# Patient Record
Sex: FEMALE | Race: WHITE | NOT HISPANIC OR LATINO
[De-identification: names, ages, dates, MRNs, and addresses within clinical notes are randomized per-mention and may not be internally consistent; named-entity substitution may affect disease eponyms.]

---

## 2018-06-06 ENCOUNTER — APPOINTMENT (OUTPATIENT)
Dept: OBGYN | Facility: CLINIC | Age: 58
End: 2018-06-06
Payer: COMMERCIAL

## 2018-06-06 VITALS
DIASTOLIC BLOOD PRESSURE: 72 MMHG | WEIGHT: 232 LBS | SYSTOLIC BLOOD PRESSURE: 103 MMHG | HEIGHT: 67 IN | BODY MASS INDEX: 36.41 KG/M2

## 2018-06-06 DIAGNOSIS — Z01.419 ENCOUNTER FOR GYNECOLOGICAL EXAMINATION (GENERAL) (ROUTINE) W/OUT ABNORMAL FINDINGS: ICD-10-CM

## 2018-06-06 PROCEDURE — 99396 PREV VISIT EST AGE 40-64: CPT

## 2018-06-07 LAB — HPV HIGH+LOW RISK DNA PNL CVX: NOT DETECTED

## 2018-06-12 LAB — CYTOLOGY CVX/VAG DOC THIN PREP: NORMAL

## 2020-09-25 ENCOUNTER — APPOINTMENT (OUTPATIENT)
Dept: DISASTER EMERGENCY | Facility: CLINIC | Age: 60
End: 2020-09-25

## 2020-09-25 DIAGNOSIS — Z01.818 ENCOUNTER FOR OTHER PREPROCEDURAL EXAMINATION: ICD-10-CM

## 2020-09-26 LAB — SARS-COV-2 N GENE NPH QL NAA+PROBE: NOT DETECTED

## 2021-03-24 ENCOUNTER — NON-APPOINTMENT (OUTPATIENT)
Age: 61
End: 2021-03-24

## 2021-03-24 DIAGNOSIS — Z63.5 DISRUPTION OF FAMILY BY SEPARATION AND DIVORCE: ICD-10-CM

## 2021-03-24 DIAGNOSIS — Z78.9 OTHER SPECIFIED HEALTH STATUS: ICD-10-CM

## 2021-03-24 RX ORDER — ESOMEPRAZOLE MAGNESIUM 20 MG/1
20 CAPSULE, DELAYED RELEASE ORAL
Refills: 0 | Status: ACTIVE | COMMUNITY

## 2021-03-24 RX ORDER — ESCITALOPRAM OXALATE 20 MG/1
20 TABLET, FILM COATED ORAL
Refills: 0 | Status: ACTIVE | COMMUNITY

## 2021-03-24 SDOH — SOCIAL STABILITY - SOCIAL INSECURITY: DISRUPTION OF FAMILY BY SEPARATION AND DIVORCE: Z63.5

## 2021-06-09 ENCOUNTER — APPOINTMENT (OUTPATIENT)
Dept: OBGYN | Facility: CLINIC | Age: 61
End: 2021-06-09
Payer: COMMERCIAL

## 2021-06-09 VITALS
BODY MASS INDEX: 38.61 KG/M2 | SYSTOLIC BLOOD PRESSURE: 130 MMHG | WEIGHT: 246 LBS | HEIGHT: 67 IN | DIASTOLIC BLOOD PRESSURE: 80 MMHG

## 2021-06-09 DIAGNOSIS — Z01.419 ENCOUNTER FOR GYNECOLOGICAL EXAMINATION (GENERAL) (ROUTINE) W/OUT ABNORMAL FINDINGS: ICD-10-CM

## 2021-06-09 DIAGNOSIS — N76.3 SUBACUTE AND CHRONIC VULVITIS: ICD-10-CM

## 2021-06-09 PROCEDURE — 99072 ADDL SUPL MATRL&STAF TM PHE: CPT

## 2021-06-09 PROCEDURE — 99396 PREV VISIT EST AGE 40-64: CPT

## 2021-06-09 RX ORDER — CLOBETASOL PROPIONATE 0.5 MG/G
0.05 CREAM TOPICAL TWICE DAILY
Qty: 1 | Refills: 1 | Status: ACTIVE | COMMUNITY
Start: 2021-06-09 | End: 1900-01-01

## 2021-06-10 LAB — HPV HIGH+LOW RISK DNA PNL CVX: NOT DETECTED

## 2021-06-14 LAB — CYTOLOGY CVX/VAG DOC THIN PREP: ABNORMAL

## 2021-06-21 ENCOUNTER — APPOINTMENT (OUTPATIENT)
Dept: OBGYN | Facility: CLINIC | Age: 61
End: 2021-06-21

## 2021-11-02 ENCOUNTER — INPATIENT (INPATIENT)
Facility: HOSPITAL | Age: 61
LOS: 1 days | Discharge: HOME | DRG: 386 | End: 2021-11-04
Attending: STUDENT IN AN ORGANIZED HEALTH CARE EDUCATION/TRAINING PROGRAM | Admitting: HOSPITALIST
Payer: COMMERCIAL

## 2021-11-02 ENCOUNTER — NON-APPOINTMENT (OUTPATIENT)
Age: 61
End: 2021-11-02

## 2021-11-02 VITALS
HEIGHT: 67 IN | HEART RATE: 89 BPM | SYSTOLIC BLOOD PRESSURE: 136 MMHG | DIASTOLIC BLOOD PRESSURE: 87 MMHG | TEMPERATURE: 98 F | WEIGHT: 240.08 LBS | OXYGEN SATURATION: 95 % | RESPIRATION RATE: 18 BRPM

## 2021-11-02 DIAGNOSIS — R10.9 UNSPECIFIED ABDOMINAL PAIN: ICD-10-CM

## 2021-11-02 LAB
ALBUMIN SERPL ELPH-MCNC: 4.2 G/DL — SIGNIFICANT CHANGE UP (ref 3.3–5)
ALP SERPL-CCNC: 92 U/L — SIGNIFICANT CHANGE UP (ref 40–120)
ALT FLD-CCNC: 17 U/L — SIGNIFICANT CHANGE UP (ref 10–45)
ANION GAP SERPL CALC-SCNC: 13 MMOL/L — SIGNIFICANT CHANGE UP (ref 5–17)
APTT BLD: 31.9 SEC — SIGNIFICANT CHANGE UP (ref 27.5–35.5)
AST SERPL-CCNC: 17 U/L — SIGNIFICANT CHANGE UP (ref 10–40)
BASOPHILS # BLD AUTO: 0.05 K/UL — SIGNIFICANT CHANGE UP (ref 0–0.2)
BASOPHILS NFR BLD AUTO: 0.5 % — SIGNIFICANT CHANGE UP (ref 0–2)
BILIRUB SERPL-MCNC: 0.2 MG/DL — SIGNIFICANT CHANGE UP (ref 0.2–1.2)
BLD GP AB SCN SERPL QL: NEGATIVE — SIGNIFICANT CHANGE UP
BUN SERPL-MCNC: 16 MG/DL — SIGNIFICANT CHANGE UP (ref 7–23)
CALCIUM SERPL-MCNC: 9.7 MG/DL — SIGNIFICANT CHANGE UP (ref 8.4–10.5)
CHLORIDE SERPL-SCNC: 102 MMOL/L — SIGNIFICANT CHANGE UP (ref 96–108)
CO2 SERPL-SCNC: 23 MMOL/L — SIGNIFICANT CHANGE UP (ref 22–31)
CREAT SERPL-MCNC: 0.87 MG/DL — SIGNIFICANT CHANGE UP (ref 0.5–1.3)
EOSINOPHIL # BLD AUTO: 0.17 K/UL — SIGNIFICANT CHANGE UP (ref 0–0.5)
EOSINOPHIL NFR BLD AUTO: 1.7 % — SIGNIFICANT CHANGE UP (ref 0–6)
GLUCOSE SERPL-MCNC: 108 MG/DL — HIGH (ref 70–99)
HCT VFR BLD CALC: 38.4 % — SIGNIFICANT CHANGE UP (ref 34.5–45)
HGB BLD-MCNC: 11.7 G/DL — SIGNIFICANT CHANGE UP (ref 11.5–15.5)
IMM GRANULOCYTES NFR BLD AUTO: 0.5 % — SIGNIFICANT CHANGE UP (ref 0–1.5)
INR BLD: 1.01 RATIO — SIGNIFICANT CHANGE UP (ref 0.88–1.16)
LIDOCAIN IGE QN: 33 U/L — SIGNIFICANT CHANGE UP (ref 7–60)
LYMPHOCYTES # BLD AUTO: 1.03 K/UL — SIGNIFICANT CHANGE UP (ref 1–3.3)
LYMPHOCYTES # BLD AUTO: 10.2 % — LOW (ref 13–44)
MCHC RBC-ENTMCNC: 24.9 PG — LOW (ref 27–34)
MCHC RBC-ENTMCNC: 30.5 GM/DL — LOW (ref 32–36)
MCV RBC AUTO: 81.7 FL — SIGNIFICANT CHANGE UP (ref 80–100)
MONOCYTES # BLD AUTO: 0.69 K/UL — SIGNIFICANT CHANGE UP (ref 0–0.9)
MONOCYTES NFR BLD AUTO: 6.9 % — SIGNIFICANT CHANGE UP (ref 2–14)
NEUTROPHILS # BLD AUTO: 8.06 K/UL — HIGH (ref 1.8–7.4)
NEUTROPHILS NFR BLD AUTO: 80.2 % — HIGH (ref 43–77)
NRBC # BLD: 0 /100 WBCS — SIGNIFICANT CHANGE UP (ref 0–0)
PLATELET # BLD AUTO: 347 K/UL — SIGNIFICANT CHANGE UP (ref 150–400)
POTASSIUM SERPL-MCNC: 4.3 MMOL/L — SIGNIFICANT CHANGE UP (ref 3.5–5.3)
POTASSIUM SERPL-SCNC: 4.3 MMOL/L — SIGNIFICANT CHANGE UP (ref 3.5–5.3)
PROT SERPL-MCNC: 7.2 G/DL — SIGNIFICANT CHANGE UP (ref 6–8.3)
PROTHROM AB SERPL-ACNC: 12.1 SEC — SIGNIFICANT CHANGE UP (ref 10.6–13.6)
RBC # BLD: 4.7 M/UL — SIGNIFICANT CHANGE UP (ref 3.8–5.2)
RBC # FLD: 14.8 % — HIGH (ref 10.3–14.5)
RH IG SCN BLD-IMP: POSITIVE — SIGNIFICANT CHANGE UP
SODIUM SERPL-SCNC: 138 MMOL/L — SIGNIFICANT CHANGE UP (ref 135–145)
WBC # BLD: 10.05 K/UL — SIGNIFICANT CHANGE UP (ref 3.8–10.5)
WBC # FLD AUTO: 10.05 K/UL — SIGNIFICANT CHANGE UP (ref 3.8–10.5)

## 2021-11-02 PROCEDURE — 93010 ELECTROCARDIOGRAM REPORT: CPT

## 2021-11-02 PROCEDURE — 74178 CT ABD&PLV WO CNTR FLWD CNTR: CPT | Mod: 26,MA

## 2021-11-02 PROCEDURE — 99285 EMERGENCY DEPT VISIT HI MDM: CPT | Mod: 25

## 2021-11-02 RX ORDER — CEFTRIAXONE 500 MG/1
1000 INJECTION, POWDER, FOR SOLUTION INTRAMUSCULAR; INTRAVENOUS ONCE
Refills: 0 | Status: COMPLETED | OUTPATIENT
Start: 2021-11-02 | End: 2021-11-02

## 2021-11-02 RX ORDER — CEFTRIAXONE 500 MG/1
1000 INJECTION, POWDER, FOR SOLUTION INTRAMUSCULAR; INTRAVENOUS ONCE
Refills: 0 | Status: DISCONTINUED | OUTPATIENT
Start: 2021-11-02 | End: 2021-11-02

## 2021-11-02 RX ORDER — ACETAMINOPHEN 500 MG
975 TABLET ORAL ONCE
Refills: 0 | Status: COMPLETED | OUTPATIENT
Start: 2021-11-02 | End: 2021-11-02

## 2021-11-02 RX ORDER — ONDANSETRON 8 MG/1
4 TABLET, FILM COATED ORAL ONCE
Refills: 0 | Status: COMPLETED | OUTPATIENT
Start: 2021-11-02 | End: 2021-11-02

## 2021-11-02 RX ORDER — METRONIDAZOLE 500 MG
500 TABLET ORAL ONCE
Refills: 0 | Status: COMPLETED | OUTPATIENT
Start: 2021-11-02 | End: 2021-11-02

## 2021-11-02 RX ADMIN — ONDANSETRON 4 MILLIGRAM(S): 8 TABLET, FILM COATED ORAL at 16:49

## 2021-11-02 RX ADMIN — CEFTRIAXONE 100 MILLIGRAM(S): 500 INJECTION, POWDER, FOR SOLUTION INTRAMUSCULAR; INTRAVENOUS at 23:11

## 2021-11-02 RX ADMIN — Medication 100 MILLIGRAM(S): at 21:09

## 2021-11-02 RX ADMIN — Medication 975 MILLIGRAM(S): at 16:48

## 2021-11-02 NOTE — ED PROVIDER NOTE - CARE PLAN
1 Principal Discharge DX:	Abdominal pain  Secondary Diagnosis:	Bloody stool  Secondary Diagnosis:	Pancolitis

## 2021-11-02 NOTE — ED ADULT NURSE NOTE - OBJECTIVE STATEMENT
61 y/o female PMHX Colitis, Gerd, presents with complaints of blood in stool. pt states on Sunday and Monday she noticed dark colored blood clots in stool, today it has subsided. pt states she has mid abdominal pain radiating to left lower abdominal region. pt denies any interventions. states pain is 7/10 colicky pain. denies any CP or SOB, denies any fever, chills, nausea, vomiting or diarrhea. denies any dizziness or blurry vision. pt states she has a headache. denies any urinary symptoms. safety precautions in place call bell in reach.

## 2021-11-02 NOTE — ED PROVIDER NOTE - CLINICAL SUMMARY MEDICAL DECISION MAKING FREE TEXT BOX
eriberto/pgy1: 61yo F with pmh of colitis never had bloody stool, no formal diagnosis of UC/IBD presents with bloody stool and diarrhea and LLQ abd pain. pain control, labs, CT a/p.

## 2021-11-02 NOTE — ED PROVIDER NOTE - PHYSICAL EXAMINATION
Reggie QUINTEROS:  VS noted  Gen. no acute distress, Non toxic   HEENT: EOMI, mmm  Lungs: CTAB/L no C/ W /R   CVS: RRR   Abd; Soft non tender, non distended, no CVA tenderness  Ext: no edema  Skin: no rash  Neuro AAOx3 non focal clear speech

## 2021-11-02 NOTE — ED ADULT NURSE REASSESSMENT NOTE - NS ED NURSE REASSESS COMMENT FT1
Pt is breathing unlabored on RA. Vital signs stable. Pt endorsing abd pain and nausea at this time. MD aware, states medication to be ordered. Educated pt on plan of care. Safety and comfort maintained.

## 2021-11-02 NOTE — ED PROVIDER NOTE - PROGRESS NOTE DETAILS
eriberto/pgy1: spoke to pt , made her aware of CT scan results. AGreeable to dc for IV fluids/antibiotics and pain control until feels improved. eriberto/pgy1: spoke to pt , made her aware of CT scan results. AGreeable to admit for IV fluids/antibiotics and pain control until feels improved.

## 2021-11-02 NOTE — ED PROVIDER NOTE - OBJECTIVE STATEMENT
59 yo female PMH of colitis and intestinal polyps on colonoscopy x1 year ago, presents complaining of dark red clots in stool x3 days. States started at random, has been passing dark red clots with and without stool since 10/31/21. + diarrhea, admits to normally having diarrhea dt colitis. She states clots stopped this morning at 8am and had x1 normal bowel movement in hospital today without clots. +tactile fever (not recorded at home), + nausea, + intermittent 5/10 abdominal pain in epigastric and LLQ relieved with defecation. + severe HA, has not taken anything for pain.     Patient denies trauma, dizziness, cough, SOB, chest pain, fatigue/ weakness, pain with urination, hematuria, recent illness, recent change in weight.

## 2021-11-02 NOTE — ED PROVIDER NOTE - ATTENDING CONTRIBUTION TO CARE
Patient is a 61 yo F with history of colitis x 1 year here for bloody stool x 3 days. She reports 4 episodes of bloody stool with clots. Denies a prior history of GI bleed. She has been following with Dr. Goodman of GI Patient is a 59 yo F with history of colitis x 1 year here for bloody stool x 3 days. She reports 4 episodes of bloody stool with clots. Denies a prior history of GI bleed. She has been following with Dr. Goodman of GI. Last colonoscopy last year. She reports she had some polyps. She states she was treated with steroids when she was initially diagnosed with colitis but nothing recently. Denies fevers, chills. She reports nausea, no vomiting. No urinary symptoms. She has chronic diarrhea. No recent antibiotic use. No chest pain or shortness of breath.    VS noted  Gen. no acute distress, Non toxic   HEENT: EOMI, mmm  Lungs: CTAB/L no C/ W /R   CVS: RRR   Abd; Soft non tender, non distended, no CVA tenderness  Ext: no edema  Skin: no rash  Neuro AAOx3 non focal clear speech  a/p: BRBPR - plan for labs, CTA A/P and reassess.   - Reggie QUINTEROS Patient is a 61 yo F with history of colitis x 1 year here for bloody stool x 3 days. She reports 4 episodes of bloody stool with clots. Denies a prior history of GI bleed. She has been following with Dr. Goodman of GI. Last colonoscopy last year. She reports she had some polyps. She states she was treated with steroids when she was initially diagnosed with colitis but nothing recently. Denies fevers, chills. She reports nausea, no vomiting. No urinary symptoms. She has chronic diarrhea. No recent antibiotic use. No chest pain or shortness of breath.    VS noted  Gen. no acute distress, Non toxic   HEENT: EOMI, mmm  Lungs: CTAB/L no C/ W /R   CVS: RRR   Abd; Soft non tender, non distended, no CVA tenderness  Ext: no edema  Skin: no rash  Neuro AAOx3 non focal clear speech  a/p: BRBPR - plan for labs, CTA A/P to eval for active bleed/ colitis, and reassess.   - Reggie QUINTEROS

## 2021-11-02 NOTE — ED PROVIDER NOTE - RAPID ASSESSMENT
Patient presenting with bloody stools and "clots" x days. Also having intermittent generalized abdominal pain. Diagnosed with "colitis" presumptively by outpatient provider. Some associated nausea today, no vomiting. Patient presenting with bloody stools and "clots" x days. Also having intermittent generalized abdominal pain. Diagnosed with "colitis" presumptively by outpatient provider. Some associated nausea today, no vomiting.      IGalo MD, performed an initial face to face bedside interview with this patient regarding history of present illness and determined that the patient should be evaluated in the main ED. This patient's evaluation is  NOT COMPLETE and only preliminary. The full assessment and management of this patient is deferred to the main ED provider.

## 2021-11-03 ENCOUNTER — NON-APPOINTMENT (OUTPATIENT)
Age: 61
End: 2021-11-03

## 2021-11-03 DIAGNOSIS — Z98.51 TUBAL LIGATION STATUS: Chronic | ICD-10-CM

## 2021-11-03 DIAGNOSIS — Z29.9 ENCOUNTER FOR PROPHYLACTIC MEASURES, UNSPECIFIED: ICD-10-CM

## 2021-11-03 DIAGNOSIS — K21.9 GASTRO-ESOPHAGEAL REFLUX DISEASE WITHOUT ESOPHAGITIS: ICD-10-CM

## 2021-11-03 DIAGNOSIS — K76.0 FATTY (CHANGE OF) LIVER, NOT ELSEWHERE CLASSIFIED: ICD-10-CM

## 2021-11-03 DIAGNOSIS — K51.00 ULCERATIVE (CHRONIC) PANCOLITIS WITHOUT COMPLICATIONS: ICD-10-CM

## 2021-11-03 DIAGNOSIS — F32.9 MAJOR DEPRESSIVE DISORDER, SINGLE EPISODE, UNSPECIFIED: ICD-10-CM

## 2021-11-03 DIAGNOSIS — E03.9 HYPOTHYROIDISM, UNSPECIFIED: ICD-10-CM

## 2021-11-03 LAB
ANION GAP SERPL CALC-SCNC: 12 MMOL/L — SIGNIFICANT CHANGE UP (ref 5–17)
BUN SERPL-MCNC: 13 MG/DL — SIGNIFICANT CHANGE UP (ref 7–23)
CALCIUM SERPL-MCNC: 9.7 MG/DL — SIGNIFICANT CHANGE UP (ref 8.4–10.5)
CHLORIDE SERPL-SCNC: 105 MMOL/L — SIGNIFICANT CHANGE UP (ref 96–108)
CO2 SERPL-SCNC: 24 MMOL/L — SIGNIFICANT CHANGE UP (ref 22–31)
CREAT SERPL-MCNC: 0.9 MG/DL — SIGNIFICANT CHANGE UP (ref 0.5–1.3)
CRP SERPL-MCNC: 17 MG/L — HIGH (ref 0–4)
CULTURE RESULTS: SIGNIFICANT CHANGE UP
ERYTHROCYTE [SEDIMENTATION RATE] IN BLOOD: 52 MM/HR — HIGH (ref 0–20)
GLUCOSE SERPL-MCNC: 103 MG/DL — HIGH (ref 70–99)
HCT VFR BLD CALC: 34.1 % — LOW (ref 34.5–45)
HGB BLD-MCNC: 10.5 G/DL — LOW (ref 11.5–15.5)
MAGNESIUM SERPL-MCNC: 2 MG/DL — SIGNIFICANT CHANGE UP (ref 1.6–2.6)
MCHC RBC-ENTMCNC: 24.8 PG — LOW (ref 27–34)
MCHC RBC-ENTMCNC: 30.8 GM/DL — LOW (ref 32–36)
MCV RBC AUTO: 80.6 FL — SIGNIFICANT CHANGE UP (ref 80–100)
NRBC # BLD: 0 /100 WBCS — SIGNIFICANT CHANGE UP (ref 0–0)
PHOSPHATE SERPL-MCNC: 4 MG/DL — SIGNIFICANT CHANGE UP (ref 2.5–4.5)
PLATELET # BLD AUTO: 307 K/UL — SIGNIFICANT CHANGE UP (ref 150–400)
POTASSIUM SERPL-MCNC: 4.1 MMOL/L — SIGNIFICANT CHANGE UP (ref 3.5–5.3)
POTASSIUM SERPL-SCNC: 4.1 MMOL/L — SIGNIFICANT CHANGE UP (ref 3.5–5.3)
RBC # BLD: 4.23 M/UL — SIGNIFICANT CHANGE UP (ref 3.8–5.2)
RBC # FLD: 15 % — HIGH (ref 10.3–14.5)
SARS-COV-2 RNA SPEC QL NAA+PROBE: SIGNIFICANT CHANGE UP
SODIUM SERPL-SCNC: 141 MMOL/L — SIGNIFICANT CHANGE UP (ref 135–145)
SPECIMEN SOURCE: SIGNIFICANT CHANGE UP
WBC # BLD: 8.53 K/UL — SIGNIFICANT CHANGE UP (ref 3.8–10.5)
WBC # FLD AUTO: 8.53 K/UL — SIGNIFICANT CHANGE UP (ref 3.8–10.5)

## 2021-11-03 PROCEDURE — 12345: CPT | Mod: NC

## 2021-11-03 PROCEDURE — 99223 1ST HOSP IP/OBS HIGH 75: CPT

## 2021-11-03 RX ORDER — ESCITALOPRAM OXALATE 10 MG/1
20 TABLET, FILM COATED ORAL DAILY
Refills: 0 | Status: DISCONTINUED | OUTPATIENT
Start: 2021-11-03 | End: 2021-11-04

## 2021-11-03 RX ORDER — ONDANSETRON 8 MG/1
4 TABLET, FILM COATED ORAL ONCE
Refills: 0 | Status: COMPLETED | OUTPATIENT
Start: 2021-11-03 | End: 2021-11-03

## 2021-11-03 RX ORDER — CIPROFLOXACIN LACTATE 400MG/40ML
400 VIAL (ML) INTRAVENOUS EVERY 12 HOURS
Refills: 0 | Status: DISCONTINUED | OUTPATIENT
Start: 2021-11-03 | End: 2021-11-04

## 2021-11-03 RX ORDER — PANTOPRAZOLE SODIUM 20 MG/1
40 TABLET, DELAYED RELEASE ORAL
Refills: 0 | Status: DISCONTINUED | OUTPATIENT
Start: 2021-11-03 | End: 2021-11-04

## 2021-11-03 RX ORDER — SIMETHICONE 80 MG/1
80 TABLET, CHEWABLE ORAL ONCE
Refills: 0 | Status: COMPLETED | OUTPATIENT
Start: 2021-11-03 | End: 2021-11-03

## 2021-11-03 RX ORDER — METRONIDAZOLE 500 MG
500 TABLET ORAL EVERY 8 HOURS
Refills: 0 | Status: DISCONTINUED | OUTPATIENT
Start: 2021-11-03 | End: 2021-11-03

## 2021-11-03 RX ORDER — LEVOTHYROXINE SODIUM 125 MCG
125 TABLET ORAL DAILY
Refills: 0 | Status: DISCONTINUED | OUTPATIENT
Start: 2021-11-03 | End: 2021-11-04

## 2021-11-03 RX ORDER — METRONIDAZOLE 500 MG
500 TABLET ORAL EVERY 8 HOURS
Refills: 0 | Status: DISCONTINUED | OUTPATIENT
Start: 2021-11-03 | End: 2021-11-04

## 2021-11-03 RX ORDER — ACETAMINOPHEN 500 MG
650 TABLET ORAL EVERY 6 HOURS
Refills: 0 | Status: DISCONTINUED | OUTPATIENT
Start: 2021-11-03 | End: 2021-11-04

## 2021-11-03 RX ADMIN — Medication 200 MILLIGRAM(S): at 17:59

## 2021-11-03 RX ADMIN — Medication 500 MILLIGRAM(S): at 14:30

## 2021-11-03 RX ADMIN — Medication 200 MILLIGRAM(S): at 06:21

## 2021-11-03 RX ADMIN — Medication 650 MILLIGRAM(S): at 01:33

## 2021-11-03 RX ADMIN — SIMETHICONE 80 MILLIGRAM(S): 80 TABLET, CHEWABLE ORAL at 20:07

## 2021-11-03 RX ADMIN — ONDANSETRON 4 MILLIGRAM(S): 8 TABLET, FILM COATED ORAL at 10:29

## 2021-11-03 RX ADMIN — PANTOPRAZOLE SODIUM 40 MILLIGRAM(S): 20 TABLET, DELAYED RELEASE ORAL at 05:24

## 2021-11-03 RX ADMIN — Medication 125 MICROGRAM(S): at 05:19

## 2021-11-03 RX ADMIN — ESCITALOPRAM OXALATE 20 MILLIGRAM(S): 10 TABLET, FILM COATED ORAL at 11:17

## 2021-11-03 RX ADMIN — Medication 100 MILLIGRAM(S): at 05:19

## 2021-11-03 RX ADMIN — Medication 500 MILLIGRAM(S): at 22:23

## 2021-11-03 NOTE — H&P ADULT - NSHPLABSRESULTS_GEN_ALL_CORE
EKG personally reviewed. NSR, QTc 430  Imaging personally reviewed.   Labs personally reviewed.     c< from: CT Abdomen and Pelvis w/wo IV Cont (11.02.21 @ 19:06) >    CONTRAST/COMPLICATIONS:  IV Contrast: Omnipaque 350  90 cc administered   10 cc discarded  Oral Contrast: None  Complications: None reported at time of study completion    PROCEDURE:  CT of the Abdomen and Pelvis was performed.  Precontrast, Arterial and Delayed phases were performed.  Sagittal and coronal reformats were performed.    FINDINGS:  LOWER CHEST: Right fat-containing Bochdalek hernia. Lingular and bibasilar dependent atelectasis.    LIVER: Steatosis.  BILE DUCTS: Normal caliber.  GALLBLADDER: Within normal limits.  SPLEEN: Within normal limits.  PANCREAS: Within normal limits.  ADRENALS: Within normal limits.  KIDNEYS/URETERS: Hyperdense left renal cyst No hydronephrosis or nephrolithiasis.    BLADDER: Within normal limits.  REPRODUCTIVE ORGANS: Uterus and adnexa within normal limits.    BOWEL: No bowel obstruction. Appendix is normal. Colonic diverticulosis. Diffuse colonic wall thickening with vasa recta engorgement and areas of trace pericolonic fat stranding suggesting pancolitis. No intraluminal contrast extravasation on postcontrast images to suggest active gastrointestinal bleeding.  PERITONEUM: No ascites.  VESSELS: Atherosclerotic changes. Celiac axis, SMA, CHELY, portal vein with its tributaries are patent.  RETROPERITONEUM/LYMPH NODES: No lymphadenopathy.  ABDOMINAL WALL: Tiny fat-containing umbilical hernia.  BONES: Degenerative changes. Grade 1 retrolisthesis of L2 on L3.    IMPRESSION:    Pancolitis, infectious or inflammatory and less likely ischemic.    No evidence of activegastrointestinal bleeding.    < end of copied text >

## 2021-11-03 NOTE — H&P ADULT - PROBLEM SELECTOR PLAN 1
Patient presents with diarrhea with BRBPR found to have pancolitis on CT likely infectious vs inflammatory  - bleeding resolved, Hgb stable, continue to trend. No active bleeding on CT.  - f/u GI PCR, stool culture, and monitor stool count  - continue abx for now with cipro and flagyl  - f/u ESR, CRP  - given hx of colitis in past, consider GI consult in am  - tolerating regular diet

## 2021-11-03 NOTE — H&P ADULT - NSICDXFAMILYHX_GEN_ALL_CORE_FT
FAMILY HISTORY:  Father  Still living? Unknown  Family history of type 2 diabetes mellitus in mother, Age at diagnosis: Age Unknown    Mother  Still living? Unknown  Family history of type 2 diabetes mellitus in mother, Age at diagnosis: Age Unknown  FHx: non-Hodgkin's lymphoma, Age at diagnosis: Age Unknown     none

## 2021-11-03 NOTE — H&P ADULT - NSICDXPASTMEDICALHX_GEN_ALL_CORE_FT
PAST MEDICAL HISTORY:  Hypothyroidism      PAST MEDICAL HISTORY:  Asthma     GERD (gastroesophageal reflux disease)     Hypothyroidism     Other depression

## 2021-11-03 NOTE — H&P ADULT - ASSESSMENT
60F with PMH of colitis and intestinal polyps (last colonoscopy was 1 year ago, reported normal), asthma, depression, hypothyroid, and GERD, presents with 3 days of bloody stools with clots, found to have pancolitis on CT A/P infectious vs inflammatory.

## 2021-11-03 NOTE — H&P ADULT - HISTORY OF PRESENT ILLNESS
60F with PMH of colitis and intestinal polyps (last colonoscopy was 1 year ago, reported normal), asthma, depression, hypothyroid, and GERD, presents with 3 days of bloody stools with clots. Patient says she has had diarrhea with watery stools for years now (up to 8 BMs a day), being worked up by GI (Dr Mckinley). 3 days ago, she started having blood BMs with clots as well as epigastric abdominal pain and LLQ pain. Since then the bloody BMs have resolved. Patient also endorses nausea but no vomiting and has been tolerating her diet. Patient denies any fevers, chills, chest pain, shortness of breath, dysuria or hematuria. No recent weight loss. Patient is on a low diet.     In the ED, T was 98.1, HR75, /87, RR 18 satting 95% on room air. Patient received Tylenol 975mg X1, Zofran 4IV X1, ceftriaxone 1g X1, and flagyl 500mg X1.

## 2021-11-03 NOTE — PATIENT PROFILE ADULT - FALL HARM RISK
Quality 110: Preventive Care And Screening: Influenza Immunization: Influenza Immunization Administered during Influenza season Detail Level: Detailed Quality 111:Pneumonia Vaccination Status For Older Adults: Pneumococcal Vaccination Previously Received Quality 130: Documentation Of Current Medications In The Medical Record: Current Medications Documented n/a

## 2021-11-03 NOTE — CHART NOTE - NSCHARTNOTEFT_GEN_A_CORE
60F with PMH of colitis and intestinal polyps (last colonoscopy was 1 year ago, reported normal), asthma, depression, hypothyroid, and GERD, presents with 3 days of bloody stools with clots, found to have pancolitis on CTAP    Started on cipro/flagyl overnight  Hemodynamically stable, remains afebrile  Pt subjectively feels less distended this morning, was able to tolerate full breakfast    - continue cipro/flagyl and monitor   - if able to tolerate regular diet today, transition to po abx and dc planning in AM    above plans discussed with NP Tara Banks MD  Division of Hospital Medicine  Cell: [phone]  Office: [phone]

## 2021-11-04 ENCOUNTER — TRANSCRIPTION ENCOUNTER (OUTPATIENT)
Age: 61
End: 2021-11-04

## 2021-11-04 VITALS
RESPIRATION RATE: 18 BRPM | DIASTOLIC BLOOD PRESSURE: 69 MMHG | TEMPERATURE: 99 F | OXYGEN SATURATION: 96 % | SYSTOLIC BLOOD PRESSURE: 115 MMHG | HEART RATE: 83 BPM

## 2021-11-04 DIAGNOSIS — K92.1 MELENA: ICD-10-CM

## 2021-11-04 DIAGNOSIS — K51.00 ULCERATIVE (CHRONIC) PANCOLITIS WITHOUT COMPLICATIONS: ICD-10-CM

## 2021-11-04 LAB
C DIFF GDH STL QL: NEGATIVE — SIGNIFICANT CHANGE UP
C DIFF GDH STL QL: SIGNIFICANT CHANGE UP
COVID-19 NUCLEOCAPSID GAM AB INTERP: POSITIVE
COVID-19 NUCLEOCAPSID TOTAL GAM ANTIBODY RESULT: 1.86 INDEX — HIGH
COVID-19 SPIKE DOMAIN AB INTERP: POSITIVE
COVID-19 SPIKE DOMAIN ANTIBODY RESULT: >250 U/ML — HIGH
HCV AB S/CO SERPL IA: 0.2 S/CO — SIGNIFICANT CHANGE UP (ref 0–0.99)
HCV AB SERPL-IMP: SIGNIFICANT CHANGE UP
SARS-COV-2 IGG+IGM SERPL QL IA: 1.86 INDEX — HIGH
SARS-COV-2 IGG+IGM SERPL QL IA: >250 U/ML — HIGH
SARS-COV-2 IGG+IGM SERPL QL IA: POSITIVE
SARS-COV-2 IGG+IGM SERPL QL IA: POSITIVE

## 2021-11-04 PROCEDURE — U0003: CPT

## 2021-11-04 PROCEDURE — 99233 SBSQ HOSP IP/OBS HIGH 50: CPT

## 2021-11-04 PROCEDURE — 74178 CT ABD&PLV WO CNTR FLWD CNTR: CPT | Mod: MA

## 2021-11-04 PROCEDURE — 86850 RBC ANTIBODY SCREEN: CPT

## 2021-11-04 PROCEDURE — 96374 THER/PROPH/DIAG INJ IV PUSH: CPT

## 2021-11-04 PROCEDURE — 85730 THROMBOPLASTIN TIME PARTIAL: CPT

## 2021-11-04 PROCEDURE — 86769 SARS-COV-2 COVID-19 ANTIBODY: CPT

## 2021-11-04 PROCEDURE — 87324 CLOSTRIDIUM AG IA: CPT

## 2021-11-04 PROCEDURE — 86140 C-REACTIVE PROTEIN: CPT

## 2021-11-04 PROCEDURE — 86900 BLOOD TYPING SEROLOGIC ABO: CPT

## 2021-11-04 PROCEDURE — 96375 TX/PRO/DX INJ NEW DRUG ADDON: CPT

## 2021-11-04 PROCEDURE — 80048 BASIC METABOLIC PNL TOTAL CA: CPT

## 2021-11-04 PROCEDURE — 85027 COMPLETE CBC AUTOMATED: CPT

## 2021-11-04 PROCEDURE — 83735 ASSAY OF MAGNESIUM: CPT

## 2021-11-04 PROCEDURE — U0005: CPT

## 2021-11-04 PROCEDURE — 85025 COMPLETE CBC W/AUTO DIFF WBC: CPT

## 2021-11-04 PROCEDURE — 87449 NOS EACH ORGANISM AG IA: CPT

## 2021-11-04 PROCEDURE — 36415 COLL VENOUS BLD VENIPUNCTURE: CPT

## 2021-11-04 PROCEDURE — 85610 PROTHROMBIN TIME: CPT

## 2021-11-04 PROCEDURE — 84100 ASSAY OF PHOSPHORUS: CPT

## 2021-11-04 PROCEDURE — 87507 IADNA-DNA/RNA PROBE TQ 12-25: CPT

## 2021-11-04 PROCEDURE — 87046 STOOL CULTR AEROBIC BACT EA: CPT

## 2021-11-04 PROCEDURE — 93005 ELECTROCARDIOGRAM TRACING: CPT

## 2021-11-04 PROCEDURE — 83690 ASSAY OF LIPASE: CPT

## 2021-11-04 PROCEDURE — 86803 HEPATITIS C AB TEST: CPT

## 2021-11-04 PROCEDURE — 85652 RBC SED RATE AUTOMATED: CPT

## 2021-11-04 PROCEDURE — 87077 CULTURE AEROBIC IDENTIFY: CPT

## 2021-11-04 PROCEDURE — 87045 FECES CULTURE AEROBIC BACT: CPT

## 2021-11-04 PROCEDURE — 86901 BLOOD TYPING SEROLOGIC RH(D): CPT

## 2021-11-04 PROCEDURE — 99285 EMERGENCY DEPT VISIT HI MDM: CPT | Mod: 25

## 2021-11-04 PROCEDURE — 80053 COMPREHEN METABOLIC PANEL: CPT

## 2021-11-04 RX ORDER — MOXIFLOXACIN HYDROCHLORIDE TABLETS, 400 MG 400 MG/1
1 TABLET, FILM COATED ORAL
Qty: 16 | Refills: 0
Start: 2021-11-04 | End: 2021-11-11

## 2021-11-04 RX ORDER — SIMETHICONE 80 MG/1
80 TABLET, CHEWABLE ORAL ONCE
Refills: 0 | Status: COMPLETED | OUTPATIENT
Start: 2021-11-04 | End: 2021-11-04

## 2021-11-04 RX ORDER — SIMETHICONE 80 MG/1
1 TABLET, CHEWABLE ORAL
Qty: 30 | Refills: 0
Start: 2021-11-04 | End: 2021-12-03

## 2021-11-04 RX ORDER — METRONIDAZOLE 500 MG
1 TABLET ORAL
Qty: 24 | Refills: 0
Start: 2021-11-04 | End: 2021-11-11

## 2021-11-04 RX ADMIN — Medication 125 MICROGRAM(S): at 06:16

## 2021-11-04 RX ADMIN — Medication 500 MILLIGRAM(S): at 13:05

## 2021-11-04 RX ADMIN — ESCITALOPRAM OXALATE 20 MILLIGRAM(S): 10 TABLET, FILM COATED ORAL at 12:55

## 2021-11-04 RX ADMIN — Medication 500 MILLIGRAM(S): at 06:18

## 2021-11-04 RX ADMIN — Medication 650 MILLIGRAM(S): at 04:58

## 2021-11-04 RX ADMIN — Medication 650 MILLIGRAM(S): at 16:42

## 2021-11-04 RX ADMIN — Medication 200 MILLIGRAM(S): at 06:16

## 2021-11-04 RX ADMIN — SIMETHICONE 80 MILLIGRAM(S): 80 TABLET, CHEWABLE ORAL at 15:20

## 2021-11-04 RX ADMIN — Medication 650 MILLIGRAM(S): at 05:30

## 2021-11-04 RX ADMIN — Medication 200 MILLIGRAM(S): at 16:41

## 2021-11-04 RX ADMIN — PANTOPRAZOLE SODIUM 40 MILLIGRAM(S): 20 TABLET, DELAYED RELEASE ORAL at 06:16

## 2021-11-04 NOTE — DISCHARGE NOTE PROVIDER - NSDCPNSUBOBJ_GEN_ALL_CORE
Pt admitted for colitis ciff and PCR stool negative.  Has hx of 3 years of colitis with outpt GI w/u.  Now not having bloodly diarrhea. Can f/u as outpt with GI for ongoing management.  Will DC on 10 days of antibiotics for infectious colitis on top of her chronic diarrhea.  Seen by GI shivam mallory.  Discharge time 45 minutes

## 2021-11-04 NOTE — DISCHARGE NOTE PROVIDER - HOSPITAL COURSE
60F with PMH of colitis and intestinal polyps (last colonoscopy was 1 year ago, reported normal), asthma, depression, hypothyroid, and GERD, presents with 3 days of bloody stools with clots, found to have pancolitis on CT A/P infectious vs inflammatory.      Problem/Plan - 1:  ·  Problem: Pancolitis.   ·  Plan: 4/7 hx of diarrhea with BRBPR on background of 3yr history of chronic diarrhoea. NO known personal/family hx of IBD, was on steroid however.  Vitilago but no other autoimmune disease. NO recent Abx use. Last scope (SEPT 2020) shows 'sandlike' colonic wall, but NO inflammation/ polyps.  - CT on admission shows pancolitis  - Diarrhoea has improved, still mildly worse than normal as per patient  - Bleeding resolved, Hgb stable (105 from 117), other BWs unremarkable (no electrolyte imbalance, CRP 1.7, normal renal function)  - Tolerating full oral diet, patient would like to return home  - Stool studies NEG; C.diff NEG  - Day 2 antibiotics (metronidazole and cipro), both oral now, will continue for total of 10 days  - GI consulted pt wants to switch outpt f/u.       Problem/Plan - 2:  ·  Problem: Hypothyroid.   ·  Plan: - continue home synthroid.     Problem/Plan - 3:  ·  Problem: Steatosis, liver.   ·  Plan: - CT showing hepatic steatosis likely weigh related no significant ETOH history  - no LFT abnormalities  - will need outpatient follow up.       Problem/Plan - 4:  ·  Problem: GERD (gastroesophageal reflux disease).   ·  Plan: - start protonix 40mg qd.     Problem/Plan - 5:  ·  Problem: Major depression.   ·  Plan: - resume home escitalopram.

## 2021-11-04 NOTE — DISCHARGE NOTE PROVIDER - CARE PROVIDER_API CALL
David Perez)  Internal Medicine  [address] [place] [address]  Phone: [phone]  Fax: [phone]  Follow Up Time:

## 2021-11-04 NOTE — CONSULT NOTE ADULT - ASSESSMENT
60 year old female with hematochezia    1. Hematochezia. Anemia. ?resolved diverticular bleed, or bacterial colitis. Doubt UC flare given self limited nature, she feels back to baseline, wants to go home and follow up with her GI.   -no objection to d/c, with close outpt follow up. Indications for return to ED discussed.    2. Hx of colitis  -resume budesonide at prior dose, f/u with GI    3. HTN    4. Hypothyroid   on synthroid    5. Depression    6. Obesity  -weight loss

## 2021-11-04 NOTE — PROGRESS NOTE ADULT - SUBJECTIVE AND OBJECTIVE BOX
PROGRESS NOTE:   Jasmine Heredia DO  Hospitalist  Pager [phone]  After 5pm/weekends or if no answer ext: [phone]      Patient is a 60y old  Female who presents with a chief complaint of bloody stools (03 Nov 2021 00:42)      SUBJECTIVE / OVERNIGHT EVENTS: Abdo pain & bloody diarrhoea    ADDITIONAL REVIEW OF SYSTEMS:  No N/V, mildly worse diarrhoea    MEDICATIONS  (STANDING):  ciprofloxacin   IVPB 400 milliGRAM(s) IV Intermittent every 12 hours  escitalopram 20 milliGRAM(s) Oral daily  levothyroxine 125 MICROGram(s) Oral daily  metroNIDAZOLE    Tablet 500 milliGRAM(s) Oral every 8 hours  pantoprazole    Tablet 40 milliGRAM(s) Oral before breakfast    MEDICATIONS  (PRN):  acetaminophen     Tablet .. 650 milliGRAM(s) Oral every 6 hours PRN Temp greater or equal to 38C (100.4F), Mild Pain (1 - 3), Moderate Pain (4 - 6)      CAPILLARY BLOOD GLUCOSE        I&O's Summary    03 Nov 2021 07:01  -  04 Nov 2021 07:00  --------------------------------------------------------  IN: 200 mL / OUT: 1 mL / NET: 199 mL        PHYSICAL EXAM:  Vital Signs Last 24 Hrs  T(C): 36.7 (04 Nov 2021 05:05), Max: 36.7 (03 Nov 2021 12:36)  T(F): 98.1 (04 Nov 2021 05:05), Max: 98.1 (03 Nov 2021 12:36)  HR: 62 (04 Nov 2021 05:05) (62 - 74)  BP: 116/59 (04 Nov 2021 05:05) (113/71 - 123/64)  BP(mean): --  RR: 17 (04 Nov 2021 05:05) (17 - 18)  SpO2: 94% (04 Nov 2021 05:05) (94% - 98%)    CONSTITUTIONAL: NAD, moist mucosal lining  RESPIRATORY: Normal respiratory effort; lungs are clear to auscultation bilaterally  CARDIOVASCULAR: Regular rate and rhythm, normal S1 and S2, no murmur/rub/gallop; No lower extremity edema; Peripheral pulses are 2+ bilaterally  ABDOMEN: NON- distended, NON-tender to palpation, normoactive bowel sounds, no rebound/guarding; No hepatosplenomegaly  MUSCLOSKELETAL: no clubbing or cyanosis of digits; no joint swelling or tenderness to palpation  PSYCH: A+O to person, place, and time; affect appropriate    LABS:                        10.5   8.53  )-----------( 307      ( 03 Nov 2021 05:46 )             34.1     11-03    141  |  105  |  13  ----------------------------<  103<H>  4.1   |  24  |  0.90    Ca    9.7      03 Nov 2021 05:46  Phos  4.0     11-03  Mg     2.0     11-03    TPro  7.2  /  Alb  4.2  /  TBili  0.2  /  DBili  x   /  AST  17  /  ALT  17  /  AlkPhos  92  11-02    PT/INR - ( 02 Nov 2021 15:35 )   PT: 12.1 sec;   INR: 1.01 ratio         PTT - ( 02 Nov 2021 15:35 )  PTT:31.9 sec          GI PCR Panel, Stool (collected 03 Nov 2021 16:38)  Source: .Stool Feces  Final Report (03 Nov 2021 21:42):    GI PCR Results: NOT detected    *******Please Note:*******    GI panel PCR evaluates for:    Campylobacter, Plesiomonas shigelloides, Salmonella,    Vibrio, Yersinia enterocolitica, Enteroaggregative    Escherichia coli (EAEC), Enteropathogenic E.coli (EPEC),    Enterotoxigenic E. coli (ETEC) lt/st, Shiga-like    toxin-producing E. coli (STEC) stx1/stx2,    Shigella/ Enteroinvasive E. coli (EIEC), Cryptosporidium,    Cyclospora cayetanensis, Entamoeba histolytica,    Giardia lamblia, Adenovirus F 40/41, Astrovirus,    Norovirus GI/GII, Rotavirus A, Sapovirus        RADIOLOGY & ADDITIONAL TESTS:  Results Reviewed:   Imaging Personally Reviewed:  Electrocardiogram Personally Reviewed:    COORDINATION OF CARE:  Care Discussed with Consultants/Other Providers [Y/N]:  Prior or Outpatient Records Reviewed [Y/N]:   PROGRESS NOTE:   Jasmine Heredia DO  Hospitalist  Pager [phone]  After 5pm/weekends or if no answer ext: [phone]      Patient is a 60y old  Female who presents with a chief complaint of bloody stools (03 Nov 2021 00:42)      SUBJECTIVE / OVERNIGHT EVENTS: Abdo pain & bloody diarrhea    ADDITIONAL REVIEW OF SYSTEMS:  No N/V, mildly worse diarrhea    MEDICATIONS  (STANDING):  ciprofloxacin   IVPB 400 milliGRAM(s) IV Intermittent every 12 hours  escitalopram 20 milliGRAM(s) Oral daily  levothyroxine 125 MICROGram(s) Oral daily  metroNIDAZOLE    Tablet 500 milliGRAM(s) Oral every 8 hours  pantoprazole    Tablet 40 milliGRAM(s) Oral before breakfast    MEDICATIONS  (PRN):  acetaminophen     Tablet .. 650 milliGRAM(s) Oral every 6 hours PRN Temp greater or equal to 38C (100.4F), Mild Pain (1 - 3), Moderate Pain (4 - 6)      CAPILLARY BLOOD GLUCOSE        I&O's Summary    03 Nov 2021 07:01  -  04 Nov 2021 07:00  --------------------------------------------------------  IN: 200 mL / OUT: 1 mL / NET: 199 mL        PHYSICAL EXAM:  Vital Signs Last 24 Hrs  T(C): 36.7 (04 Nov 2021 05:05), Max: 36.7 (03 Nov 2021 12:36)  T(F): 98.1 (04 Nov 2021 05:05), Max: 98.1 (03 Nov 2021 12:36)  HR: 62 (04 Nov 2021 05:05) (62 - 74)  BP: 116/59 (04 Nov 2021 05:05) (113/71 - 123/64)  BP(mean): --  RR: 17 (04 Nov 2021 05:05) (17 - 18)  SpO2: 94% (04 Nov 2021 05:05) (94% - 98%)    CONSTITUTIONAL: NAD, moist mucosal lining  RESPIRATORY: Normal respiratory effort; lungs are clear to auscultation bilaterally  CARDIOVASCULAR: Regular rate and rhythm, normal S1 and S2, no murmur/rub/gallop; No lower extremity edema; Peripheral pulses are 2+ bilaterally  ABDOMEN: NON- distended, NON-tender to palpation, normoactive bowel sounds, no rebound/guarding; No hepatosplenomegaly  MUSCLOSKELETAL: no clubbing or cyanosis of digits; no joint swelling or tenderness to palpation  PSYCH: A+O to person, place, and time; affect appropriate    LABS:                        10.5   8.53  )-----------( 307      ( 03 Nov 2021 05:46 )             34.1     11-03    141  |  105  |  13  ----------------------------<  103<H>  4.1   |  24  |  0.90    Ca    9.7      03 Nov 2021 05:46  Phos  4.0     11-03  Mg     2.0     11-03    TPro  7.2  /  Alb  4.2  /  TBili  0.2  /  DBili  x   /  AST  17  /  ALT  17  /  AlkPhos  92  11-02    PT/INR - ( 02 Nov 2021 15:35 )   PT: 12.1 sec;   INR: 1.01 ratio         PTT - ( 02 Nov 2021 15:35 )  PTT:31.9 sec          GI PCR Panel, Stool (collected 03 Nov 2021 16:38)  Source: .Stool Feces  Final Report (03 Nov 2021 21:42):    GI PCR Results: NOT detected    *******Please Note:*******    GI panel PCR evaluates for:    Campylobacter, Plesiomonas shigelloides, Salmonella,    Vibrio, Yersinia enterocolitica, Enteroaggregative    Escherichia coli (EAEC), Enteropathogenic E.coli (EPEC),    Enterotoxigenic E. coli (ETEC) lt/st, Shiga-like    toxin-producing E. coli (STEC) stx1/stx2,    Shigella/ Enteroinvasive E. coli (EIEC), Cryptosporidium,    Cyclospora cayetanensis, Entamoeba histolytica,    Giardia lamblia, Adenovirus F 40/41, Astrovirus,    Norovirus GI/GII, Rotavirus A, Sapovirus        RADIOLOGY & ADDITIONAL TESTS:  Results Reviewed:   Imaging Personally Reviewed:  Electrocardiogram Personally Reviewed:    COORDINATION OF CARE:  Care Discussed with Consultants/Other Providers [Y/N]:  Prior or Outpatient Records Reviewed [Y/N]:

## 2021-11-04 NOTE — DISCHARGE NOTE NURSING/CASE MANAGEMENT/SOCIAL WORK - PATIENT PORTAL LINK FT
You can access the FollowMyHealth Patient Portal offered by [facility] by registering at the following website: [email or web address]. By joining [facility]’s FollowMyHealth portal, you will also be able to view your health information using other applications (apps) compatible with our system.

## 2021-11-04 NOTE — PROGRESS NOTE ADULT - PROBLEM SELECTOR PLAN 1
4/7 hx of diarrhea with BRBPR on background of 3yr history of chronic diarrhoea. NO known personal/family hx of IBD, was on steroid however.  Vitilago but no other autoimmune disease. NO recent Abx use. Last scope (SEPT 2020) shows 'sandlike' colonic wall, but NO inflammation/ polyps.  - CT on admission shows pancolitis  - Diarrhoea has improved, still mildly worse than normal as per patient  - Bleeding resolved, Hgb stable (105 from 117), other BWs unremarkable (no electrolyte imbalance, CRP 1.7, normal renal function)  - Tolerating full oral diet, patient would like to return home  - Stool studies NEG; C.diff NEG  - Day 2 antibiotics (metronidazole and cipro), both oral now, will continue for total of 10 days  - OP GI f/u 4/7 hx of diarrhea with BRBPR on background of 3yr history of chronic diarrhoea. NO known personal/family hx of IBD, was on steroid however.  Vitilago but no other autoimmune disease. NO recent Abx use. Last scope (SEPT 2020) shows 'sandlike' colonic wall, but NO inflammation/ polyps.  - CT on admission shows pancolitis  - Diarrhoea has improved, still mildly worse than normal as per patient  - Bleeding resolved, Hgb stable (105 from 117), other BWs unremarkable (no electrolyte imbalance, CRP 1.7, normal renal function)  - Tolerating full oral diet, patient would like to return home  - Stool studies NEG; C.diff NEG  - Day 2 antibiotics (metronidazole and cipro), both oral now, will continue for total of 10 days  - GI consulted as IP, advised... 4/7 hx of diarrhea with BRBPR on background of 3yr history of chronic diarrhoea. NO known personal/family hx of IBD, was on steroid however.  Vitilago but no other autoimmune disease. NO recent Abx use. Last scope (SEPT 2020) shows 'sandlike' colonic wall, but NO inflammation/ polyps.  - CT on admission shows pancolitis  - Diarrhoea has improved, still mildly worse than normal as per patient  - Bleeding resolved, Hgb stable (105 from 117), other BWs unremarkable (no electrolyte imbalance, CRP 1.7, normal renal function)  - Tolerating full oral diet, patient would like to return home  - Stool studies NEG; C.diff NEG  - Day 2 antibiotics (metronidazole and cipro), both oral now, will continue for total of 10 days  - GI consulted pt wants to switch outpt f/u

## 2021-11-04 NOTE — CONSULT NOTE ADULT - SUBJECTIVE AND OBJECTIVE BOX
Chief Complaint:  Patient is a 60y old  Female who presents with a chief complaint of bloody stools (04 Nov 2021 15:53)      Date of service: 11-04-21 @ 22:56    HPI:    The patient is a 60 year old female hx of asthma, GERD, hypothyroid depression, ?colitis who presented with BRBPR.  The patient has a hx of "pancolitis" per her GI Dr. Hernandez. She cannot say if it UC or microscopic colitis. She is on budesonide and has chronic watery diarrhea.  She had a few days of BRBPR with clots and presented to the ED. At present time the symptoms have resolved and she now is having "chunky green stool".    The patient denies dysphagia, nausea and vomiting, abdominal pain, diarrhea, unintentional weight loss, change in bowel habits or NSAID use.      Allergies:  No Known Allergies      Home Medications:    Hospital Medications:  acetaminophen     Tablet .. 650 milliGRAM(s) Oral every 6 hours PRN  ciprofloxacin   IVPB 400 milliGRAM(s) IV Intermittent every 12 hours  escitalopram 20 milliGRAM(s) Oral daily  levothyroxine 125 MICROGram(s) Oral daily  metroNIDAZOLE    Tablet 500 milliGRAM(s) Oral every 8 hours  pantoprazole    Tablet 40 milliGRAM(s) Oral before breakfast      PMHX/PSHX:  Hypothyroidism    Asthma    Other depression    GERD (gastroesophageal reflux disease)    H/O tubal ligation        Family history:  FHx: non-Hodgkin&#x27;s lymphoma (Mother)    Family history of type 2 diabetes mellitus in mother (Mother, Father)        Social History:   Denies ethanol use.  Denies illicit drug use.    ROS:     General:  No wt loss, fevers, chills, night sweats, fatigue,   Eyes:  Good vision, no reported pain  ENT:  No sore throat, pain, runny nose, dysphagia  CV:  No pain, palpitations, hypo/hypertension  Resp:  No dyspnea, cough, tachypnea, wheezing  GI:  See HPI  :  No pain, bleeding, incontinence, nocturia  Muscle:  No pain, weakness  Neuro:  No weakness, tingling, memory problems  Psych:  No fatigue, insomnia, mood problems, depression  Endocrine:  No polyuria, polydipsia, cold/heat intolerance  Heme:  No petechiae, ecchymosis, easy bruisability  Integumentary:  No rash, edema      PHYSICAL EXAM:     GENERAL:  Appears stated age, well-groomed, well-nourished, no distress  HEENT:  NC/AT,  conjunctivae anicteric, clear and pink,   NECK: supple, trachea midline  CHEST:  Full & symmetric excursion, no increased effort, breath sounds clear  HEART:  Regular rhythm, no JVD  ABDOMEN:  Soft, non-tender, non-distended, normoactive bowel sounds,  no masses , no hepatosplenomegaly  EXTREMITIES:  no cyanosis,clubbing or edema  SKIN:  No rash, erythema, or, ecchymoses, no jaundice  NEURO:  Alert, non-focal, no asterixis  PSYCH: Appropriate affect, oriented to place and time  RECTAL: Deferred      Vital Signs:  Vital Signs Last 24 Hrs  T(C): 37.2 (04 Nov 2021 13:55), Max: 37.2 (04 Nov 2021 13:55)  T(F): 99 (04 Nov 2021 13:55), Max: 99 (04 Nov 2021 13:55)  HR: 83 (04 Nov 2021 13:55) (62 - 83)  BP: 115/69 (04 Nov 2021 13:55) (115/69 - 116/59)  BP(mean): --  RR: 18 (04 Nov 2021 13:55) (17 - 18)  SpO2: 96% (04 Nov 2021 13:55) (94% - 96%)  Daily     Daily     LABS: Labs personally reviewed by me:                        10.5   8.53  )-----------( 307      ( 03 Nov 2021 05:46 )             34.1     11-03    141  |  105  |  13  ----------------------------<  103<H>  4.1   |  24  |  0.90    Ca    9.7      03 Nov 2021 05:46  Phos  4.0     11-03  Mg     2.0     11-03                Imaging personally reviewed by me:

## 2021-11-04 NOTE — PROGRESS NOTE ADULT - PROBLEM SELECTOR PLAN 3
- CT showing hepatic steatosis  - no LFT abnormalities  - will need outpatient follow up - CT showing hepatic steatosis likely weigh related no significant ETOH history  - no LFT abnormalities  - will need outpatient follow up

## 2021-11-04 NOTE — DISCHARGE NOTE PROVIDER - NSDCCPCAREPLAN_GEN_ALL_CORE_FT
PRINCIPAL DISCHARGE DIAGNOSIS  Diagnosis: Pancolitis  Assessment and Plan of Treatment: Continue Cipro and Flagyl for 8 more days   Follow up with Gi as outpatient      SECONDARY DISCHARGE DIAGNOSES  Diagnosis: Fatty liver  Assessment and Plan of Treatment: Follow up with GI for ongoing monitoring

## 2021-11-04 NOTE — DISCHARGE NOTE PROVIDER - NSDCMRMEDTOKEN_GEN_ALL_CORE_FT
budesonide 3 mg oral capsule, extended release: 3 cap(s) orally once a day  Cipro 500 mg oral tablet: 1 tab(s) orally every 12 hours  escitalopram 20 mg oral tablet: 1 tab(s) orally once a day  metroNIDAZOLE 500 mg oral tablet: 1 tab(s) orally every 8 hours  omeprazole 20 mg oral delayed release capsule: 1 cap(s) orally once a day  Probiotic Formula oral capsule: 1 cap(s) orally once a day  simethicone 80 mg oral tablet, chewable: 1 tab(s) orally once  Unithroid 125 mcg (0.125 mg) oral tablet: 1 tab(s) orally once a day

## 2021-11-05 LAB
CULTURE RESULTS: SIGNIFICANT CHANGE UP
SPECIMEN SOURCE: SIGNIFICANT CHANGE UP

## 2021-11-11 ENCOUNTER — APPOINTMENT (OUTPATIENT)
Dept: GASTROENTEROLOGY | Facility: CLINIC | Age: 61
End: 2021-11-11
Payer: COMMERCIAL

## 2021-11-11 VITALS
WEIGHT: 240 LBS | OXYGEN SATURATION: 98 % | HEART RATE: 70 BPM | SYSTOLIC BLOOD PRESSURE: 110 MMHG | DIASTOLIC BLOOD PRESSURE: 70 MMHG | TEMPERATURE: 98.4 F | BODY MASS INDEX: 37.67 KG/M2 | HEIGHT: 67 IN

## 2021-11-11 PROCEDURE — 99213 OFFICE O/P EST LOW 20 MIN: CPT

## 2021-11-11 NOTE — HISTORY OF PRESENT ILLNESS
[FreeTextEntry1] : I saw patient Ceci Crain in the office today.  The patient is a 61-year-old female with a history of ulcerative colitis.  The patient has been undergoing regular colonoscopic examinations and her exam done in September 2020 did reveal mild active chronic colitis.  The patient has had medication issues in the past and currently is on budesonide.  She had some type of reaction in the past to mesalamine.  She has required in the past brief courses of steroids.  Patient had been doing fairly well with her baseline bowel movements when after drinking alcohol and eating out the patient developed acute onset of significant abdominal pain with diarrhea.  Diarrhea persisted and subsequently the patient did notice blood and clots in the stool.  The bleeding has subsequently subsided but the patient notices 4-5 incomplete evacuations a day with some tenesmus.  The patient went to the emergency room and had a CAT scan which revealed pancolitis.  The patient does not abuse tobacco caffeine or ethanol.

## 2021-11-11 NOTE — ASSESSMENT
[FreeTextEntry1] : The patient is a 61-year-old female with a history of ulcerative colitis.  Although her symptoms were never totally under control the patient has been doing well until an acute onset of significant diarrhea with blood.  A CAT scan did reveal colitis.  It is difficult to ascertain as to whether his symptoms were totally acute in nature  or due to some dietary indiscretion with underlying mild smoldering colitis.  I instructed the patient to complete her antibiotic treatment to get her back to her baseline.  She is currently on budesonide and I had wished to use biologic agent on this patient but she is very hesitant to switch to this category of drugs.  We may need to retry her on mesalamine in the future.  The patient will get back to me with a progress report at which point we will decide on the level of her chronic symptoms.

## 2022-02-23 ENCOUNTER — INPATIENT (INPATIENT)
Facility: HOSPITAL | Age: 62
LOS: 4 days | Discharge: HOME | DRG: 386 | End: 2022-02-28
Attending: STUDENT IN AN ORGANIZED HEALTH CARE EDUCATION/TRAINING PROGRAM | Admitting: HOSPITALIST
Payer: COMMERCIAL

## 2022-02-23 VITALS
HEIGHT: 67 IN | TEMPERATURE: 100 F | WEIGHT: 240.08 LBS | DIASTOLIC BLOOD PRESSURE: 64 MMHG | RESPIRATION RATE: 18 BRPM | SYSTOLIC BLOOD PRESSURE: 104 MMHG | OXYGEN SATURATION: 99 % | HEART RATE: 110 BPM

## 2022-02-23 DIAGNOSIS — Z98.51 TUBAL LIGATION STATUS: Chronic | ICD-10-CM

## 2022-02-23 PROCEDURE — 99285 EMERGENCY DEPT VISIT HI MDM: CPT

## 2022-02-23 RX ORDER — SODIUM CHLORIDE 9 MG/ML
1500 INJECTION, SOLUTION INTRAVENOUS ONCE
Refills: 0 | Status: COMPLETED | OUTPATIENT
Start: 2022-02-23 | End: 2022-02-23

## 2022-02-23 RX ORDER — CIPROFLOXACIN LACTATE 400MG/40ML
400 VIAL (ML) INTRAVENOUS ONCE
Refills: 0 | Status: COMPLETED | OUTPATIENT
Start: 2022-02-23 | End: 2022-02-23

## 2022-02-23 RX ORDER — ONDANSETRON 8 MG/1
4 TABLET, FILM COATED ORAL ONCE
Refills: 0 | Status: COMPLETED | OUTPATIENT
Start: 2022-02-23 | End: 2022-02-23

## 2022-02-23 RX ORDER — ACETAMINOPHEN 500 MG
975 TABLET ORAL ONCE
Refills: 0 | Status: COMPLETED | OUTPATIENT
Start: 2022-02-23 | End: 2022-02-23

## 2022-02-23 RX ORDER — METRONIDAZOLE 500 MG
500 TABLET ORAL ONCE
Refills: 0 | Status: COMPLETED | OUTPATIENT
Start: 2022-02-23 | End: 2022-02-23

## 2022-02-23 RX ORDER — SODIUM CHLORIDE 9 MG/ML
3400 INJECTION, SOLUTION INTRAVENOUS ONCE
Refills: 0 | Status: DISCONTINUED | OUTPATIENT
Start: 2022-02-23 | End: 2022-02-23

## 2022-02-23 RX ADMIN — ONDANSETRON 4 MILLIGRAM(S): 8 TABLET, FILM COATED ORAL at 23:51

## 2022-02-23 RX ADMIN — SODIUM CHLORIDE 1000 MILLILITER(S): 9 INJECTION, SOLUTION INTRAVENOUS at 23:52

## 2022-02-23 NOTE — ED ADULT NURSE NOTE - OBJECTIVE STATEMENT
Pt AO3 recently treated for colitis with ABX last month, p/w nausea and vomiting x1 week. Pt reports subjective fevers/chills, continuous nausea and >5 episodes of clear emesis everyday. Pt states she thought it would go away on its own. Pt denies abdominal pain, chest pain, sob, melena, hematochezia, falls, trauma, urinary symptoms. Pt afebrile in ED, ice chips provided as requested. IVF initiated. Made comfortable.

## 2022-02-23 NOTE — ED PROVIDER NOTE - CLINICAL SUMMARY MEDICAL DECISION MAKING FREE TEXT BOX
Anderson Prakash PGY2. 61F Anderson Prakash PGY2. 61F w/ hx of pancolitis, GERD, hypothyroid p/w N/V/D x 1 week. Sepsis+ likely colitis vs. pancreatitis vs. IBD. Low concern for c-diff, no recent abx.  Labs, IVF, BCx, UA UCx, CT a/p, IV abx, tylenol, zofran. Likely admit. Anderson Brenton Prakash PGY2. 61F w/ hx of pancolitis, GERD, hypothyroid p/w N/V/D x 1 week. Sepsis+ likely colitis vs. pancreatitis vs. IBD. Low concern for c-diff, no recent abx.  Labs, IVF, BCx, UA UCx, CT a/p, IV abx, tylenol, zofran. Likely admit.    Lorenza: 61 year old female with pmhx of pancolitis, here with n/v/d x 1 week.  febrile at home and here. sirs criteria met. + ttp diffuse abdomen. will get labs, ivf, iv abx, ct a/p, likely will need to admit.

## 2022-02-23 NOTE — ED PROVIDER NOTE - NSICDXPASTMEDICALHX_GEN_ALL_CORE_FT
PAST MEDICAL HISTORY:  Asthma     GERD (gastroesophageal reflux disease)     Hypothyroidism     Other depression

## 2022-02-23 NOTE — ED PROVIDER NOTE - NSICDXFAMILYHX_GEN_ALL_CORE_FT
FAMILY HISTORY:  Father  Still living? Unknown  Family history of type 2 diabetes mellitus in mother, Age at diagnosis: Age Unknown    Mother  Still living? Unknown  Family history of type 2 diabetes mellitus in mother, Age at diagnosis: Age Unknown  FHx: non-Hodgkin's lymphoma, Age at diagnosis: Age Unknown

## 2022-02-23 NOTE — ED PROVIDER NOTE - PHYSICAL EXAMINATION
Physical Exam:  Gen: Alert, well-developed, NAD  HEENT: NCAT, EOMI, clear conjunctiva, no scleral icterus, slight dry mm  Neck: Supple, no neck mass  CV: RRR, S1S2, no m/r/g  Resp: CTAB, normal respiratory effort  Abd: soft. + epigatric, LLQ TTP  Ext: no edema, no clubbing or cyanosis, 2+ pulses  Neuro: AOx3, CN2-12 grossly intact, BATES  Skin: no rash  Psych: normal affect Physical Exam:  Gen: Alert, well-developed, NAD  HEENT: NCAT, EOMI, clear conjunctiva, no scleral icterus, slight dry mm  Neck: Supple, no neck mass  CV: RRR, S1S2, no m/r/g  Resp: CTAB, normal respiratory effort  Abd: soft. + epigastric, LLQ TTP. no referred pain.  nondistended.   Ext: no edema, no clubbing or cyanosis, 2+ pulses  Neuro: AOx3, CN2-12 grossly intact, BATES  Skin: no rash  Psych: normal affect

## 2022-02-23 NOTE — ED PROVIDER NOTE - NS ED ROS FT
General: + fevers, fatigue.   HEENT: No headaches  CVS: No chest pain, palpitations  Resp: + cough. No dyspnea, wheezing  GI: + abd pain, nausea, vomiting, diarrhea. No hematochezia, melena  : No dysuria, hematuria, or discharge  MSK: No joint pain or swelling  Ext: No edema, no claudication  Skin: No rashes or itching  Heme: No easy bruising or petechiae  Neuro: No confusion  Endocrine: No excessive heat or cold symptoms, polyuria, polydipsia

## 2022-02-23 NOTE — ED PROVIDER NOTE - OBJECTIVE STATEMENT
61F w/ hx of pancolitis in 11/2021, GERD, hypothyroid who presents with N/V/D x 1 week, febrile at home. LLQ pain. NBNB emesis, nonbloody watery diarrhea.  Started mesalamine ~ 1 week ago, Rx by GI Dr. Hernandez.  No recent abx, no hx of C diff colitis.  Drinking pedialyte at home, but still feels dehydrated, fatigue.     Febrile 100.4, tachycardic 110. 61F w/ hx of pancolitis in 11/2021 unknown etiology, GERD, hypothyroid who presents with N/V/D x 1 week, febrile at home. LLQ pain. NBNB emesis, nonbloody watery diarrhea.  Started mesalamine ~ 1 week ago, Rx by GI Dr. Hernandez.  No recent abx, no hx of C diff colitis.  Drinking pedialyte at home, but still feels dehydrated, fatigue.     Febrile 100.4, tachycardic 110.

## 2022-02-24 DIAGNOSIS — N17.9 ACUTE KIDNEY FAILURE, UNSPECIFIED: ICD-10-CM

## 2022-02-24 DIAGNOSIS — A41.9 SEPSIS, UNSPECIFIED ORGANISM: ICD-10-CM

## 2022-02-24 DIAGNOSIS — D64.9 ANEMIA, UNSPECIFIED: ICD-10-CM

## 2022-02-24 DIAGNOSIS — Z29.9 ENCOUNTER FOR PROPHYLACTIC MEASURES, UNSPECIFIED: ICD-10-CM

## 2022-02-24 DIAGNOSIS — F41.9 ANXIETY DISORDER, UNSPECIFIED: ICD-10-CM

## 2022-02-24 DIAGNOSIS — K51.00 ULCERATIVE (CHRONIC) PANCOLITIS WITHOUT COMPLICATIONS: ICD-10-CM

## 2022-02-24 DIAGNOSIS — E03.9 HYPOTHYROIDISM, UNSPECIFIED: ICD-10-CM

## 2022-02-24 PROBLEM — F32.89 OTHER SPECIFIED DEPRESSIVE EPISODES: Chronic | Status: ACTIVE | Noted: 2021-11-03

## 2022-02-24 PROBLEM — K21.9 GASTRO-ESOPHAGEAL REFLUX DISEASE WITHOUT ESOPHAGITIS: Chronic | Status: ACTIVE | Noted: 2021-11-03

## 2022-02-24 PROBLEM — J45.909 UNSPECIFIED ASTHMA, UNCOMPLICATED: Chronic | Status: ACTIVE | Noted: 2021-11-03

## 2022-02-24 LAB
ALBUMIN SERPL ELPH-MCNC: 4 G/DL — SIGNIFICANT CHANGE UP (ref 3.3–5)
ALP SERPL-CCNC: 93 U/L — SIGNIFICANT CHANGE UP (ref 40–120)
ALT FLD-CCNC: 14 U/L — SIGNIFICANT CHANGE UP (ref 10–45)
ANION GAP SERPL CALC-SCNC: 12 MMOL/L — SIGNIFICANT CHANGE UP (ref 5–17)
ANION GAP SERPL CALC-SCNC: 15 MMOL/L — SIGNIFICANT CHANGE UP (ref 5–17)
APPEARANCE UR: ABNORMAL
APTT BLD: 31.3 SEC — SIGNIFICANT CHANGE UP (ref 27.5–35.5)
AST SERPL-CCNC: 10 U/L — SIGNIFICANT CHANGE UP (ref 10–40)
BACTERIA # UR AUTO: NEGATIVE — SIGNIFICANT CHANGE UP
BASE EXCESS BLDV CALC-SCNC: -1.7 MMOL/L — SIGNIFICANT CHANGE UP (ref -2–2)
BASOPHILS # BLD AUTO: 0.04 K/UL — SIGNIFICANT CHANGE UP (ref 0–0.2)
BASOPHILS NFR BLD AUTO: 0.4 % — SIGNIFICANT CHANGE UP (ref 0–2)
BILIRUB SERPL-MCNC: 0.4 MG/DL — SIGNIFICANT CHANGE UP (ref 0.2–1.2)
BILIRUB UR-MCNC: NEGATIVE — SIGNIFICANT CHANGE UP
BUN SERPL-MCNC: 8 MG/DL — SIGNIFICANT CHANGE UP (ref 7–23)
BUN SERPL-MCNC: 9 MG/DL — SIGNIFICANT CHANGE UP (ref 7–23)
CA-I SERPL-SCNC: 1.24 MMOL/L — SIGNIFICANT CHANGE UP (ref 1.15–1.33)
CALCIUM SERPL-MCNC: 9.3 MG/DL — SIGNIFICANT CHANGE UP (ref 8.4–10.5)
CALCIUM SERPL-MCNC: 9.6 MG/DL — SIGNIFICANT CHANGE UP (ref 8.4–10.5)
CHLORIDE BLDV-SCNC: 100 MMOL/L — SIGNIFICANT CHANGE UP (ref 96–108)
CHLORIDE SERPL-SCNC: 103 MMOL/L — SIGNIFICANT CHANGE UP (ref 96–108)
CHLORIDE SERPL-SCNC: 97 MMOL/L — SIGNIFICANT CHANGE UP (ref 96–108)
CO2 BLDV-SCNC: 24 MMOL/L — SIGNIFICANT CHANGE UP (ref 22–26)
CO2 SERPL-SCNC: 20 MMOL/L — LOW (ref 22–31)
CO2 SERPL-SCNC: 22 MMOL/L — SIGNIFICANT CHANGE UP (ref 22–31)
COLOR SPEC: YELLOW — SIGNIFICANT CHANGE UP
CREAT SERPL-MCNC: 0.99 MG/DL — SIGNIFICANT CHANGE UP (ref 0.5–1.3)
CREAT SERPL-MCNC: 1.24 MG/DL — SIGNIFICANT CHANGE UP (ref 0.5–1.3)
CULTURE RESULTS: SIGNIFICANT CHANGE UP
DIFF PNL FLD: ABNORMAL
EOSINOPHIL # BLD AUTO: 0.07 K/UL — SIGNIFICANT CHANGE UP (ref 0–0.5)
EOSINOPHIL NFR BLD AUTO: 0.7 % — SIGNIFICANT CHANGE UP (ref 0–6)
EPI CELLS # UR: 4 — SIGNIFICANT CHANGE UP
GAS PNL BLDV: 131 MMOL/L — LOW (ref 136–145)
GAS PNL BLDV: SIGNIFICANT CHANGE UP
GAS PNL BLDV: SIGNIFICANT CHANGE UP
GLUCOSE BLDV-MCNC: 145 MG/DL — HIGH (ref 70–99)
GLUCOSE SERPL-MCNC: 115 MG/DL — HIGH (ref 70–99)
GLUCOSE SERPL-MCNC: 138 MG/DL — HIGH (ref 70–99)
GLUCOSE UR QL: NEGATIVE — SIGNIFICANT CHANGE UP
GRAN CASTS # UR COMP ASSIST: 3 /LPF — SIGNIFICANT CHANGE UP
HCO3 BLDV-SCNC: 23 MMOL/L — SIGNIFICANT CHANGE UP (ref 22–29)
HCT VFR BLD CALC: 33.2 % — LOW (ref 34.5–45)
HCT VFR BLD CALC: 36.5 % — SIGNIFICANT CHANGE UP (ref 34.5–45)
HCT VFR BLDA CALC: 36 % — SIGNIFICANT CHANGE UP (ref 34.5–46.5)
HGB BLD CALC-MCNC: 12 G/DL — SIGNIFICANT CHANGE UP (ref 11.7–16.1)
HGB BLD-MCNC: 10.4 G/DL — LOW (ref 11.5–15.5)
HGB BLD-MCNC: 11.6 G/DL — SIGNIFICANT CHANGE UP (ref 11.5–15.5)
HYALINE CASTS # UR AUTO: 48 /LPF — HIGH (ref 0–7)
IMM GRANULOCYTES NFR BLD AUTO: 0.7 % — SIGNIFICANT CHANGE UP (ref 0–1.5)
INR BLD: 1.26 RATIO — HIGH (ref 0.88–1.16)
KETONES UR-MCNC: NEGATIVE — SIGNIFICANT CHANGE UP
LACTATE BLDV-MCNC: 1.2 MMOL/L — SIGNIFICANT CHANGE UP (ref 0.7–2)
LEUKOCYTE ESTERASE UR-ACNC: NEGATIVE — SIGNIFICANT CHANGE UP
LIDOCAIN IGE QN: 27 U/L — SIGNIFICANT CHANGE UP (ref 7–60)
LYMPHOCYTES # BLD AUTO: 0.54 K/UL — LOW (ref 1–3.3)
LYMPHOCYTES # BLD AUTO: 5.4 % — LOW (ref 13–44)
MAGNESIUM SERPL-MCNC: 1.6 MG/DL — SIGNIFICANT CHANGE UP (ref 1.6–2.6)
MCHC RBC-ENTMCNC: 24.7 PG — LOW (ref 27–34)
MCHC RBC-ENTMCNC: 25 PG — LOW (ref 27–34)
MCHC RBC-ENTMCNC: 31.3 GM/DL — LOW (ref 32–36)
MCHC RBC-ENTMCNC: 31.8 GM/DL — LOW (ref 32–36)
MCV RBC AUTO: 78.7 FL — LOW (ref 80–100)
MCV RBC AUTO: 78.9 FL — LOW (ref 80–100)
MONOCYTES # BLD AUTO: 1.02 K/UL — HIGH (ref 0–0.9)
MONOCYTES NFR BLD AUTO: 10.3 % — SIGNIFICANT CHANGE UP (ref 2–14)
NEUTROPHILS # BLD AUTO: 8.18 K/UL — HIGH (ref 1.8–7.4)
NEUTROPHILS NFR BLD AUTO: 82.5 % — HIGH (ref 43–77)
NITRITE UR-MCNC: NEGATIVE — SIGNIFICANT CHANGE UP
NRBC # BLD: 0 /100 WBCS — SIGNIFICANT CHANGE UP (ref 0–0)
NRBC # BLD: 0 /100 WBCS — SIGNIFICANT CHANGE UP (ref 0–0)
PCO2 BLDV: 38 MMHG — LOW (ref 39–42)
PH BLDV: 7.39 — SIGNIFICANT CHANGE UP (ref 7.32–7.43)
PH UR: 6 — SIGNIFICANT CHANGE UP (ref 5–8)
PHOSPHATE SERPL-MCNC: 2.5 MG/DL — SIGNIFICANT CHANGE UP (ref 2.5–4.5)
PLATELET # BLD AUTO: 315 K/UL — SIGNIFICANT CHANGE UP (ref 150–400)
PLATELET # BLD AUTO: 348 K/UL — SIGNIFICANT CHANGE UP (ref 150–400)
PO2 BLDV: 38 MMHG — SIGNIFICANT CHANGE UP (ref 25–45)
POTASSIUM BLDV-SCNC: 3 MMOL/L — LOW (ref 3.5–5.1)
POTASSIUM SERPL-MCNC: 3 MMOL/L — LOW (ref 3.5–5.3)
POTASSIUM SERPL-MCNC: 3.2 MMOL/L — LOW (ref 3.5–5.3)
POTASSIUM SERPL-SCNC: 3 MMOL/L — LOW (ref 3.5–5.3)
POTASSIUM SERPL-SCNC: 3.2 MMOL/L — LOW (ref 3.5–5.3)
PROT SERPL-MCNC: 7.5 G/DL — SIGNIFICANT CHANGE UP (ref 6–8.3)
PROT UR-MCNC: ABNORMAL
PROTHROM AB SERPL-ACNC: 15.1 SEC — HIGH (ref 10.5–13.4)
RBC # BLD: 4.21 M/UL — SIGNIFICANT CHANGE UP (ref 3.8–5.2)
RBC # BLD: 4.64 M/UL — SIGNIFICANT CHANGE UP (ref 3.8–5.2)
RBC # FLD: 13.9 % — SIGNIFICANT CHANGE UP (ref 10.3–14.5)
RBC # FLD: 13.9 % — SIGNIFICANT CHANGE UP (ref 10.3–14.5)
RBC CASTS # UR COMP ASSIST: 4 /HPF — SIGNIFICANT CHANGE UP (ref 0–4)
SAO2 % BLDV: 70.7 % — SIGNIFICANT CHANGE UP (ref 67–88)
SARS-COV-2 RNA SPEC QL NAA+PROBE: SIGNIFICANT CHANGE UP
SODIUM SERPL-SCNC: 132 MMOL/L — LOW (ref 135–145)
SODIUM SERPL-SCNC: 137 MMOL/L — SIGNIFICANT CHANGE UP (ref 135–145)
SP GR SPEC: 1.01 — SIGNIFICANT CHANGE UP (ref 1.01–1.02)
SPECIMEN SOURCE: SIGNIFICANT CHANGE UP
UROBILINOGEN FLD QL: NEGATIVE — SIGNIFICANT CHANGE UP
WBC # BLD: 6.03 K/UL — SIGNIFICANT CHANGE UP (ref 3.8–10.5)
WBC # BLD: 9.92 K/UL — SIGNIFICANT CHANGE UP (ref 3.8–10.5)
WBC # FLD AUTO: 6.03 K/UL — SIGNIFICANT CHANGE UP (ref 3.8–10.5)
WBC # FLD AUTO: 9.92 K/UL — SIGNIFICANT CHANGE UP (ref 3.8–10.5)
WBC UR QL: 6 /HPF — HIGH (ref 0–5)

## 2022-02-24 PROCEDURE — 71045 X-RAY EXAM CHEST 1 VIEW: CPT | Mod: 26

## 2022-02-24 PROCEDURE — 99223 1ST HOSP IP/OBS HIGH 75: CPT | Mod: GC

## 2022-02-24 PROCEDURE — 74177 CT ABD & PELVIS W/CONTRAST: CPT | Mod: 26,MA

## 2022-02-24 RX ORDER — BUDESONIDE, MICRONIZED 100 %
3 POWDER (GRAM) MISCELLANEOUS
Qty: 0 | Refills: 0 | DISCHARGE

## 2022-02-24 RX ORDER — LEVOTHYROXINE SODIUM 125 MCG
1 TABLET ORAL
Qty: 0 | Refills: 0 | DISCHARGE

## 2022-02-24 RX ORDER — CIPROFLOXACIN LACTATE 400MG/40ML
VIAL (ML) INTRAVENOUS
Refills: 0 | Status: DISCONTINUED | OUTPATIENT
Start: 2022-02-24 | End: 2022-02-25

## 2022-02-24 RX ORDER — FLUTICASONE PROPIONATE AND SALMETEROL 50; 250 UG/1; UG/1
1 POWDER ORAL; RESPIRATORY (INHALATION)
Qty: 0 | Refills: 0 | DISCHARGE

## 2022-02-24 RX ORDER — CIPROFLOXACIN LACTATE 400MG/40ML
400 VIAL (ML) INTRAVENOUS EVERY 12 HOURS
Refills: 0 | Status: DISCONTINUED | OUTPATIENT
Start: 2022-02-24 | End: 2022-02-25

## 2022-02-24 RX ORDER — POTASSIUM CHLORIDE 20 MEQ
40 PACKET (EA) ORAL EVERY 4 HOURS
Refills: 0 | Status: COMPLETED | OUTPATIENT
Start: 2022-02-24 | End: 2022-02-24

## 2022-02-24 RX ORDER — LEVOTHYROXINE SODIUM 125 MCG
125 TABLET ORAL DAILY
Refills: 0 | Status: DISCONTINUED | OUTPATIENT
Start: 2022-02-24 | End: 2022-02-28

## 2022-02-24 RX ORDER — METRONIDAZOLE 500 MG
500 TABLET ORAL ONCE
Refills: 0 | Status: COMPLETED | OUTPATIENT
Start: 2022-02-24 | End: 2022-02-24

## 2022-02-24 RX ORDER — BUDESONIDE AND FORMOTEROL FUMARATE DIHYDRATE 160; 4.5 UG/1; UG/1
2 AEROSOL RESPIRATORY (INHALATION)
Refills: 0 | Status: DISCONTINUED | OUTPATIENT
Start: 2022-02-24 | End: 2022-02-28

## 2022-02-24 RX ORDER — ENOXAPARIN SODIUM 100 MG/ML
40 INJECTION SUBCUTANEOUS DAILY
Refills: 0 | Status: DISCONTINUED | OUTPATIENT
Start: 2022-02-24 | End: 2022-02-28

## 2022-02-24 RX ORDER — CIPROFLOXACIN LACTATE 400MG/40ML
400 VIAL (ML) INTRAVENOUS ONCE
Refills: 0 | Status: COMPLETED | OUTPATIENT
Start: 2022-02-24 | End: 2022-02-24

## 2022-02-24 RX ORDER — METRONIDAZOLE 500 MG
TABLET ORAL
Refills: 0 | Status: DISCONTINUED | OUTPATIENT
Start: 2022-02-24 | End: 2022-02-24

## 2022-02-24 RX ORDER — OMEPRAZOLE 10 MG/1
1 CAPSULE, DELAYED RELEASE ORAL
Qty: 0 | Refills: 0 | DISCHARGE

## 2022-02-24 RX ORDER — ACETAMINOPHEN 500 MG
650 TABLET ORAL EVERY 6 HOURS
Refills: 0 | Status: DISCONTINUED | OUTPATIENT
Start: 2022-02-24 | End: 2022-02-28

## 2022-02-24 RX ORDER — METRONIDAZOLE 500 MG
500 TABLET ORAL EVERY 8 HOURS
Refills: 0 | Status: DISCONTINUED | OUTPATIENT
Start: 2022-02-24 | End: 2022-02-25

## 2022-02-24 RX ORDER — METRONIDAZOLE 500 MG
500 TABLET ORAL EVERY 8 HOURS
Refills: 0 | Status: DISCONTINUED | OUTPATIENT
Start: 2022-02-24 | End: 2022-02-24

## 2022-02-24 RX ORDER — POTASSIUM CHLORIDE 20 MEQ
10 PACKET (EA) ORAL
Refills: 0 | Status: COMPLETED | OUTPATIENT
Start: 2022-02-24 | End: 2022-02-24

## 2022-02-24 RX ORDER — ESCITALOPRAM OXALATE 10 MG/1
20 TABLET, FILM COATED ORAL DAILY
Refills: 0 | Status: DISCONTINUED | OUTPATIENT
Start: 2022-02-24 | End: 2022-02-28

## 2022-02-24 RX ORDER — ONDANSETRON 8 MG/1
4 TABLET, FILM COATED ORAL EVERY 8 HOURS
Refills: 0 | Status: DISCONTINUED | OUTPATIENT
Start: 2022-02-24 | End: 2022-02-28

## 2022-02-24 RX ORDER — PANTOPRAZOLE SODIUM 20 MG/1
40 TABLET, DELAYED RELEASE ORAL
Refills: 0 | Status: DISCONTINUED | OUTPATIENT
Start: 2022-02-24 | End: 2022-02-28

## 2022-02-24 RX ORDER — L.ACIDOPH/B.ANIMALIS/B.LONGUM 15B CELL
1 CAPSULE ORAL
Qty: 0 | Refills: 0 | DISCHARGE

## 2022-02-24 RX ORDER — ALBUTEROL 90 UG/1
2 AEROSOL, METERED ORAL EVERY 6 HOURS
Refills: 0 | Status: DISCONTINUED | OUTPATIENT
Start: 2022-02-24 | End: 2022-02-28

## 2022-02-24 RX ORDER — GUAIFENESIN/DEXTROMETHORPHAN 600MG-30MG
10 TABLET, EXTENDED RELEASE 12 HR ORAL EVERY 6 HOURS
Refills: 0 | Status: DISCONTINUED | OUTPATIENT
Start: 2022-02-24 | End: 2022-02-28

## 2022-02-24 RX ORDER — ESCITALOPRAM OXALATE 10 MG/1
1 TABLET, FILM COATED ORAL
Qty: 0 | Refills: 0 | DISCHARGE

## 2022-02-24 RX ADMIN — Medication 100 MILLIGRAM(S): at 12:38

## 2022-02-24 RX ADMIN — Medication 200 MILLIGRAM(S): at 00:46

## 2022-02-24 RX ADMIN — Medication 975 MILLIGRAM(S): at 00:45

## 2022-02-24 RX ADMIN — BUDESONIDE AND FORMOTEROL FUMARATE DIHYDRATE 2 PUFF(S): 160; 4.5 AEROSOL RESPIRATORY (INHALATION) at 12:39

## 2022-02-24 RX ADMIN — Medication 975 MILLIGRAM(S): at 04:59

## 2022-02-24 RX ADMIN — Medication 40 MILLIEQUIVALENT(S): at 17:32

## 2022-02-24 RX ADMIN — Medication 125 MICROGRAM(S): at 13:40

## 2022-02-24 RX ADMIN — Medication 100 MILLIGRAM(S): at 00:45

## 2022-02-24 RX ADMIN — ESCITALOPRAM OXALATE 20 MILLIGRAM(S): 10 TABLET, FILM COATED ORAL at 13:40

## 2022-02-24 RX ADMIN — Medication 500 MILLIGRAM(S): at 20:12

## 2022-02-24 RX ADMIN — Medication 100 MILLIEQUIVALENT(S): at 03:21

## 2022-02-24 RX ADMIN — ALBUTEROL 2 PUFF(S): 90 AEROSOL, METERED ORAL at 13:42

## 2022-02-24 RX ADMIN — BUDESONIDE AND FORMOTEROL FUMARATE DIHYDRATE 2 PUFF(S): 160; 4.5 AEROSOL RESPIRATORY (INHALATION) at 17:34

## 2022-02-24 RX ADMIN — Medication 100 MILLIEQUIVALENT(S): at 08:32

## 2022-02-24 RX ADMIN — Medication 100 MILLIEQUIVALENT(S): at 06:50

## 2022-02-24 RX ADMIN — PANTOPRAZOLE SODIUM 40 MILLIGRAM(S): 20 TABLET, DELAYED RELEASE ORAL at 12:38

## 2022-02-24 RX ADMIN — Medication 40 MILLIEQUIVALENT(S): at 13:41

## 2022-02-24 RX ADMIN — Medication 200 MILLIGRAM(S): at 13:39

## 2022-02-24 RX ADMIN — Medication 600 MILLIGRAM(S): at 21:32

## 2022-02-24 NOTE — H&P ADULT - NSHPLABSRESULTS_GEN_ALL_CORE
LABS: Personally reviewed labs, imaging, and ECG                          11.6   9.92  )-----------( 348      ( 2022 00:12 )             36.5       02    132<L>  |  97  |  9   ----------------------------<  138<H>  3.0<L>   |  20<L>  |  1.24    Ca    9.6      2022 00:12    TPro  7.5  /  Alb  4.0  /  TBili  0.4  /  DBili  x   /  AST  10  /  ALT  14  /  AlkPhos  93  02-24       LIVER FUNCTIONS - ( 2022 00:12 )  Alb: 4.0 g/dL / Pro: 7.5 g/dL / ALK PHOS: 93 U/L / ALT: 14 U/L / AST: 10 U/L / GGT: x                    Urinalysis Basic - ( 2022 02:42 )    Color: Yellow / Appearance: Slightly Turbid / S.012 / pH: x  Gluc: x / Ketone: Negative  / Bili: Negative / Urobili: Negative   Blood: x / Protein: 30 mg/dL / Nitrite: Negative   Leuk Esterase: Negative / RBC: 4 /hpf / WBC 6 /HPF   Sq Epi: x / Non Sq Epi: 4 / Bacteria: Negative        PT/INR - ( 2022 00:12 )   PT: 15.1 sec;   INR: 1.26 ratio         PTT - ( 2022 00:12 )  PTT:31.3 sec    Lactate Trend            CAPILLARY BLOOD GLUCOSE                RADIOLOGY & ADDITIONAL TESTS:

## 2022-02-24 NOTE — H&P ADULT - HISTORY OF PRESENT ILLNESS
61F w/ hx of pancolitis in 11/2021 unknown etiology, GERD, hypothyroid who presents with N/V/D x 1 week, febrile at home. LLQ pain. NBNB emesis, nonbloody watery diarrhea.  Started mesalamine ~ 1 week ago, Rx by GI Dr. Hernandez.  No recent abx, no hx of C diff colitis.  Drinking pedialyte at home, but still feels dehydrated, fatigue.  61F with h/o pancolitis, seen in 11/2021 who presented with one week of nonbloody watery diarrhea, vomiting. Said that her symptoms got worst after eating beef tacos m    61F w/ hx of pancolitis in 11/2021 unknown etiology, GERD, hypothyroid who presents with N/V/D x 1 week, febrile at home. LLQ pain. NBNB emesis, nonbloody watery diarrhea.  Started mesalamine ~ 1 week ago, Rx by GI Dr. Hernandez.  No recent abx, no hx of C diff colitis.  Drinking pedialyte at home, but still feels dehydrated, fatigue.     ED course: Found to be tachycardic to 110 and febrile to 100.4. Given one dose of cipro and flagyl with 1.5L LR. Tylenol x1.  61F with h/o pancolitis, seen in 11/2021 who presented with one week of nonbloody watery diarrhea, vomiting. Said that her symptoms got worst after eating beef tacos and became nauseus. Her diarrhea was nonbloody. Also had stabbing abdominal pain that was 6/10 in intensity. Was taking advil with no relief. Had intermittent fevers. Had chills, weakness, and fatigue. Started mesalamine ~ 1 week ago, Rx by GI Dr. Hernandez.  No recent abx, no hx of C diff colitis.  Drinking pedialyte at home.     Back in 11/2021, presented for similar symptoms but endorses feeling worse.     ED course: Found to be tachycardic to 110 and febrile to 100.4. Given one dose of cipro and flagyl with 1.5L LR. Tylenol x1.     ON: Diarrhea, non vomiting.  61F with h/o pancolitis, seen in 11/2021 who presented with one week of nonbloody watery diarrhea, vomiting. Said that her symptoms got worst after eating beef tacos and became nauseus. Her diarrhea was nonbloody. Also had stabbing abdominal pain that was 6/10 in intensity. Was taking advil with no relief. Had intermittent fevers. Had chills, weakness, and fatigue. Was switched from budesonide to mesalamine ~ 1 week ago, Rx by GI Dr. Bo Hernandez.  No recent abx, no hx of C diff colitis.  Drinking pedialyte at home.     Back in 11/2021, presented for similar symptoms but endorses feeling worse.     ED course: Found to be tachycardic to 110 and febrile to 100.4. Given one dose of cipro and flagyl with 1.5L LR. Tylenol x1.

## 2022-02-24 NOTE — H&P ADULT - NSHPPHYSICALEXAM_GEN_ALL_CORE
Vital Signs Last 24 Hrs  T(C): 37.4 (24 Feb 2022 01:55), Max: 38 (23 Feb 2022 19:48)  T(F): 99.4 (24 Feb 2022 01:55), Max: 100.4 (23 Feb 2022 19:48)  HR: 96 (24 Feb 2022 01:55) (96 - 110)  BP: 114/69 (24 Feb 2022 01:55) (104/64 - 114/69)  BP(mean): --  RR: 19 (24 Feb 2022 01:55) (18 - 19)  SpO2: 100% (24 Feb 2022 01:55) (99% - 100%)    CONSTITUTIONAL: Well-groomed, in no apparent distress  EYES: No conjunctival or scleral injection, non-icteric; PERRLA and symmetric  ENMT: No external nasal lesions; nasal mucosa not inflamed; normal dentition; no pharyngeal injection or exudates, oral mucosa with moist membranes  NECK: Trachea midline without palpable neck mass; thyroid not enlarged and non-tender  RESPIRATORY: Breathing comfortably; no dullness to percussion; lungs CTA without wheeze/rhonchi/rales  CARDIOVASCULAR: +S1S2, RRR, no M/G/R; no carotid bruits; pedal pulses full and symmetric; no lower extremity edema  CHEST/BREAST: Breasts are symmetric in appearance; no palpable masses or lumps  GASTROINTESTINAL: No palpable masses or tenderness, +BS throughout, no rebound/guarding; no hepatosplenomegaly; no hernia palpated  GENITOURINARY:       MALE: Normal appearing external genitalia, no penile lesion; no palpable testicular or scrotal mass; prostate not enlarged and without palpable nodule       FEMALE: Normal appearing external genitalia, no vaginal discharge or lesion noted; examination of urethra WNL; bladder without fullness or tenderness on palpation; palpation of uterus and adnexa without tenderness or mass  LYMPHATIC: No cervical LAD or tenderness; no axillary LAD or tenderness; no inguinal LAD or tenderness  MUSCULOSKELETAL: Normal gait and station; no digital clubbing or cyanosis; no paraspinal tenderness; examination of the  (head/neck, spine/ribs/pelvis, RUE, LUE, RLE, LLE) without misalignment, normal strength and tone of extremities  SKIN: No rashes or ulcers noted; no subcutaneous nodules or induration palpable  NEUROLOGIC: CN II-XII intact; normal reflexes in upper and lower extremities; sensation intact in LEs b/l to light touch  PSYCHIATRIC: A+O x 3; mood and affect appropriate; appropriate insight and judgment Vital Signs Last 24 Hrs  T(C): 37.4 (24 Feb 2022 01:55), Max: 38 (23 Feb 2022 19:48)  T(F): 99.4 (24 Feb 2022 01:55), Max: 100.4 (23 Feb 2022 19:48)  HR: 96 (24 Feb 2022 01:55) (96 - 110)  BP: 114/69 (24 Feb 2022 01:55) (104/64 - 114/69)  BP(mean): --  RR: 19 (24 Feb 2022 01:55) (18 - 19)  SpO2: 100% (24 Feb 2022 01:55) (99% - 100%)    CONSTITUTIONAL: Well-groomed, in no apparent distress  EYES: No conjunctival or scleral injection, non-icteric; PERRLA and symmetric  ENMT: No external nasal lesions; nasal mucosa not inflamed; normal dentition; no pharyngeal injection or exudates, oral mucosa with moist membranes  NECK: Trachea midline without palpable neck mass; thyroid not enlarged and non-tender  RESPIRATORY: Breathing comfortably; no dullness to percussion; lungs CTA without wheeze/rhonchi/rales  CARDIOVASCULAR: +S1S2, RRR, no M/G/R; no carotid bruits; pedal pulses full and symmetric; no lower extremity edema  CHEST/BREAST: Breasts are symmetric in appearance; no palpable masses or lumps  GASTROINTESTINAL: No palpable masses or tenderness, +BS throughout, no rebound/guarding; no hepatosplenomegaly; no hernia palpated  LYMPHATIC: No cervical LAD or tenderness; no axillary LAD or tenderness; no inguinal LAD or tenderness  MUSCULOSKELETAL: Normal gait and station; no digital clubbing or cyanosis; no paraspinal tenderness; examination of the  (head/neck, spine/ribs/pelvis, RUE, LUE, RLE, LLE) without misalignment, normal strength and tone of extremities  SKIN: No rashes or ulcers noted; no subcutaneous nodules or induration palpable  NEUROLOGIC: CN II-XII intact; normal reflexes in upper and lower extremities; sensation intact in LEs b/l to light touch  PSYCHIATRIC: A+O x 3; mood and affect appropriate; appropriate insight and judgment Vital Signs Last 24 Hrs  T(C): 37.4 (24 Feb 2022 01:55), Max: 38 (23 Feb 2022 19:48)  T(F): 99.4 (24 Feb 2022 01:55), Max: 100.4 (23 Feb 2022 19:48)  HR: 96 (24 Feb 2022 01:55) (96 - 110)  BP: 114/69 (24 Feb 2022 01:55) (104/64 - 114/69)  BP(mean): --  RR: 19 (24 Feb 2022 01:55) (18 - 19)  SpO2: 100% (24 Feb 2022 01:55) (99% - 100%)    CONSTITUTIONAL: Well-groomed, in no apparent distress  EYES: No conjunctival or scleral injection, non-icteric; PERRLA and symmetric  ENMT: nasal mucosa not inflamed; Discoloration on T zone  oral mucosa with moist membranes  NECK: Trachea midline without palpable neck mass; thyroid not enlarged and non-tender  RESPIRATORY: Breathing comfortably; no dullness to percussion; lungs CTA without wheeze/rhonchi/rales  CARDIOVASCULAR: +S1S2, RRR  GASTROINTESTINAL: Abdominal tenderness, +BS throughout, no rebound/guarding;  MUSCULOSKELETAL: Gait deferred;  normal strength and tone of extremities  SKIN: No rashes or ulcers noted; no subcutaneous nodules or induration palpable  PSYCHIATRIC: A+O x 3; mood and affect appropriate; appropriate insight and judgment Vital Signs Last 24 Hrs  T(C): 37.4 (24 Feb 2022 01:55), Max: 38 (23 Feb 2022 19:48)  T(F): 99.4 (24 Feb 2022 01:55), Max: 100.4 (23 Feb 2022 19:48)  HR: 96 (24 Feb 2022 01:55) (96 - 110)  BP: 114/69 (24 Feb 2022 01:55) (104/64 - 114/69)  BP(mean): --  RR: 19 (24 Feb 2022 01:55) (18 - 19)  SpO2: 100% (24 Feb 2022 01:55) (99% - 100%)    CONSTITUTIONAL: Well-groomed, in no apparent distress  EYES: No conjunctival or scleral injection, non-icteric; PERRLA and symmetric  ENT: nasal mucosa not inflamed; Discoloration on T zone  oral mucosa with moist membranes  NECK: Trachea midline without palpable neck mass; thyroid not enlarged and non-tender  RESPIRATORY: Breathing comfortably; no dullness to percussion; lungs CTA without wheeze/rhonchi/rales  CARDIOVASCULAR: +S1S2, RRR  GASTROINTESTINAL: LLQ Abdominal tenderness, +BS throughout, no rebound/guarding;  MUSCULOSKELETAL: Gait deferred;  normal strength and tone of extremities  SKIN: No rashes or ulcers noted; no subcutaneous nodules or induration palpable  PSYCHIATRIC: A+O x 3; mood and affect appropriate; appropriate insight and judgment

## 2022-02-24 NOTE — H&P ADULT - ATTENDING COMMENTS
-Appears to be a gastroenteritis triggered by something she ate. -C/w cipro/flagyl for now. -F/u cultures and stool studies.   -S/p IVF's in ED.   -Clears for now, advance diet as tolerated.   -Discuss with outpatient GI for collateral.  -Hold home mesalamine for now in setting of infection.   -F/u ESR, CRP, procalcitonin, fecal calprotectin.   -Resume inhalers for history of asthma. Lungs sound ok, but patient reports cough, so will get CXR.

## 2022-02-24 NOTE — PATIENT PROFILE ADULT - LONGEST PERIOD TOBACCO-FREE
51 y/o female with hx of HTN on Losartan/HCTZ presents to ED with HTN and CP x 3 days.  Reports compliance with meds.  No dyspnea.  Self reports difficulty sleeping, unrestful sleep and is being evaluated for sleep apnea.  PE: agree with above.  A/P:  HTN, EKG, Labs and Reassess.
14 years

## 2022-02-24 NOTE — H&P ADULT - PROBLEM SELECTOR PLAN 2
Follows with [facility] Dr. Tromba   - Last colonoscopy one year ago   - CT abdomen: Mild diffuse colonic wall thickening, most pronounced involving the ascending and transverse colon  - hold mesalamine  - f/u stool studies  - f/u procal 2/2 Ulcerative colitis   Follows with [facility] Dr. Tromba   - Last colonoscopy one year ago   - CT abdomen: Mild diffuse colonic wall thickening, most pronounced involving the ascending and transverse colon  - hold mesalamine  - f/u stool studies  - f/u procal

## 2022-02-24 NOTE — H&P ADULT - PROBLEM SELECTOR PLAN 7
- DVT ppx: Lovenox   - Diet: Start on clears - DVT ppx: Lovenox   - Diet: Start on clears  - Dispo: home

## 2022-02-24 NOTE — PATIENT PROFILE ADULT - FALL HARM RISK - UNIVERSAL INTERVENTIONS
Bed in lowest position, wheels locked, appropriate side rails in place/Call bell, personal items and telephone in reach/Instruct patient to call for assistance before getting out of bed or chair/Non-slip footwear when patient is out of bed/[place] to call system/Physically safe environment - no spills, clutter or unnecessary equipment/Purposeful Proactive Rounding/Room/bathroom lighting operational, light cord in reach

## 2022-02-24 NOTE — H&P ADULT - PROBLEM SELECTOR PLAN 3
Baseline creatinine: 0.89 Today’s creatinine:1.24 but downtrending    ANGEL likely due to low PO intake   Trend BUN/Cr

## 2022-02-24 NOTE — H&P ADULT - NSHPSOCIALHISTORY_GEN_ALL_CORE
Marital Status:  Living Situation:  Occupation:  Tobacco Use:  Alcohol Use:  Drug Use:  Sexual History: Marital Status:   Living Situation: [place]   Occupation: Education  Tobacco Use: Never  Alcohol Use: Social   Drug Use: Never

## 2022-02-24 NOTE — H&P ADULT - PROBLEM SELECTOR PLAN 1
Meets 2/4 SIRS criteria (Temp, HR)  - s/p 1.5L LR in   - s/p metronidazole x 1 and cipro x 1  - Monitor off Abx   - U/A negative   - f/u BCx and UCx

## 2022-02-24 NOTE — H&P ADULT - NSHPREVIEWOFSYSTEMS_GEN_ALL_CORE
General: No fevers, chills, fatigue, weight loss  HEENT: No headaches, acute visual changes, rhinorrhea, sore throat, dysphagia  CVS: No chest pain, palpitations  Resp: No cough, dyspnea, wheezing  GI: No abdominal pain, nausea, vomiting, constipation, diarrhea, hematochezia, melena  : No dysuria, frequency, hematuria, or discharge  MSK: No joint pain or swelling  Ext: No edema, no claudication  Skin: No rashes or itching  Heme: No easy bruising or petechiae  Neuro: No confusion, numbness, focal weakness, or loss of consciousness  Endocrine: No excessive heat or cold symptoms, polyuria, polydipsia General: +fevers  HEENT: No headaches, acute visual changes, rhinorrhea, sore throat, dysphagia  CVS: No chest pain, palpitations  Resp: No cough, dyspnea, wheezing  GI: + abdominal pain, +nausea, +vomiting, +diarrhea, Denies hematochezia  : No dysuria, frequency, hematuria, or discharge  MSK: No joint pain or swelling  Ext: No edema, no claudication  Skin: Facial rash on T zone  Neuro: No confusion, numbness, focal weakness, or loss of consciousness

## 2022-02-25 LAB
A1C WITH ESTIMATED AVERAGE GLUCOSE RESULT: 5.9 % — HIGH (ref 4–5.6)
ALBUMIN SERPL ELPH-MCNC: 3.4 G/DL — SIGNIFICANT CHANGE UP (ref 3.3–5)
ALP SERPL-CCNC: 74 U/L — SIGNIFICANT CHANGE UP (ref 40–120)
ALT FLD-CCNC: 12 U/L — SIGNIFICANT CHANGE UP (ref 10–45)
ANION GAP SERPL CALC-SCNC: 13 MMOL/L — SIGNIFICANT CHANGE UP (ref 5–17)
AST SERPL-CCNC: 11 U/L — SIGNIFICANT CHANGE UP (ref 10–40)
BASOPHILS # BLD AUTO: 0.02 K/UL — SIGNIFICANT CHANGE UP (ref 0–0.2)
BASOPHILS NFR BLD AUTO: 0.4 % — SIGNIFICANT CHANGE UP (ref 0–2)
BILIRUB SERPL-MCNC: 0.1 MG/DL — LOW (ref 0.2–1.2)
BUN SERPL-MCNC: 5 MG/DL — LOW (ref 7–23)
CALCIUM SERPL-MCNC: 9.4 MG/DL — SIGNIFICANT CHANGE UP (ref 8.4–10.5)
CHLORIDE SERPL-SCNC: 106 MMOL/L — SIGNIFICANT CHANGE UP (ref 96–108)
CO2 SERPL-SCNC: 20 MMOL/L — LOW (ref 22–31)
CREAT SERPL-MCNC: 0.91 MG/DL — SIGNIFICANT CHANGE UP (ref 0.5–1.3)
CRP SERPL-MCNC: 125 MG/L — HIGH (ref 0–4)
EOSINOPHIL # BLD AUTO: 0.15 K/UL — SIGNIFICANT CHANGE UP (ref 0–0.5)
EOSINOPHIL NFR BLD AUTO: 3 % — SIGNIFICANT CHANGE UP (ref 0–6)
ERYTHROCYTE [SEDIMENTATION RATE] IN BLOOD: 91 MM/HR — HIGH (ref 0–20)
ESTIMATED AVERAGE GLUCOSE: 123 MG/DL — HIGH (ref 68–114)
FERRITIN SERPL-MCNC: 134 NG/ML — SIGNIFICANT CHANGE UP (ref 15–150)
GLUCOSE SERPL-MCNC: 116 MG/DL — HIGH (ref 70–99)
HCT VFR BLD CALC: 31.3 % — LOW (ref 34.5–45)
HGB BLD-MCNC: 9.9 G/DL — LOW (ref 11.5–15.5)
IMM GRANULOCYTES NFR BLD AUTO: 0.6 % — SIGNIFICANT CHANGE UP (ref 0–1.5)
IRON SATN MFR SERPL: 20 UG/DL — LOW (ref 30–160)
IRON SATN MFR SERPL: 9 % — LOW (ref 14–50)
LYMPHOCYTES # BLD AUTO: 0.45 K/UL — LOW (ref 1–3.3)
LYMPHOCYTES # BLD AUTO: 8.9 % — LOW (ref 13–44)
MAGNESIUM SERPL-MCNC: 1.6 MG/DL — SIGNIFICANT CHANGE UP (ref 1.6–2.6)
MCHC RBC-ENTMCNC: 24.8 PG — LOW (ref 27–34)
MCHC RBC-ENTMCNC: 31.6 GM/DL — LOW (ref 32–36)
MCV RBC AUTO: 78.4 FL — LOW (ref 80–100)
MONOCYTES # BLD AUTO: 0.54 K/UL — SIGNIFICANT CHANGE UP (ref 0–0.9)
MONOCYTES NFR BLD AUTO: 10.6 % — SIGNIFICANT CHANGE UP (ref 2–14)
NEUTROPHILS # BLD AUTO: 3.89 K/UL — SIGNIFICANT CHANGE UP (ref 1.8–7.4)
NEUTROPHILS NFR BLD AUTO: 76.5 % — SIGNIFICANT CHANGE UP (ref 43–77)
NRBC # BLD: 0 /100 WBCS — SIGNIFICANT CHANGE UP (ref 0–0)
PHOSPHATE SERPL-MCNC: 2.7 MG/DL — SIGNIFICANT CHANGE UP (ref 2.5–4.5)
PLATELET # BLD AUTO: 307 K/UL — SIGNIFICANT CHANGE UP (ref 150–400)
POTASSIUM SERPL-MCNC: 3.7 MMOL/L — SIGNIFICANT CHANGE UP (ref 3.5–5.3)
POTASSIUM SERPL-SCNC: 3.7 MMOL/L — SIGNIFICANT CHANGE UP (ref 3.5–5.3)
PROCALCITONIN SERPL-MCNC: 0.09 NG/ML — SIGNIFICANT CHANGE UP (ref 0.02–0.1)
PROT SERPL-MCNC: 6.1 G/DL — SIGNIFICANT CHANGE UP (ref 6–8.3)
RBC # BLD: 3.99 M/UL — SIGNIFICANT CHANGE UP (ref 3.8–5.2)
RBC # FLD: 14.2 % — SIGNIFICANT CHANGE UP (ref 10.3–14.5)
SODIUM SERPL-SCNC: 139 MMOL/L — SIGNIFICANT CHANGE UP (ref 135–145)
TIBC SERPL-MCNC: 228 UG/DL — SIGNIFICANT CHANGE UP (ref 220–430)
UIBC SERPL-MCNC: 208 UG/DL — SIGNIFICANT CHANGE UP (ref 110–370)
WBC # BLD: 5.08 K/UL — SIGNIFICANT CHANGE UP (ref 3.8–10.5)
WBC # FLD AUTO: 5.08 K/UL — SIGNIFICANT CHANGE UP (ref 3.8–10.5)

## 2022-02-25 PROCEDURE — 99232 SBSQ HOSP IP/OBS MODERATE 35: CPT

## 2022-02-25 RX ORDER — MESALAMINE 400 MG
4.8 TABLET, DELAYED RELEASE (ENTERIC COATED) ORAL DAILY
Refills: 0 | Status: DISCONTINUED | OUTPATIENT
Start: 2022-02-25 | End: 2022-02-25

## 2022-02-25 RX ORDER — METRONIDAZOLE 500 MG
500 TABLET ORAL EVERY 8 HOURS
Refills: 0 | Status: DISCONTINUED | OUTPATIENT
Start: 2022-02-25 | End: 2022-02-26

## 2022-02-25 RX ORDER — DIPHENHYDRAMINE HCL 50 MG
25 CAPSULE ORAL ONCE
Refills: 0 | Status: COMPLETED | OUTPATIENT
Start: 2022-02-25 | End: 2022-02-25

## 2022-02-25 RX ORDER — MESALAMINE 400 MG
4800 TABLET, DELAYED RELEASE (ENTERIC COATED) ORAL DAILY
Refills: 0 | Status: DISCONTINUED | OUTPATIENT
Start: 2022-02-25 | End: 2022-02-25

## 2022-02-25 RX ORDER — CIPROFLOXACIN LACTATE 400MG/40ML
400 VIAL (ML) INTRAVENOUS EVERY 12 HOURS
Refills: 0 | Status: DISCONTINUED | OUTPATIENT
Start: 2022-02-25 | End: 2022-02-26

## 2022-02-25 RX ADMIN — Medication 100 MILLIGRAM(S): at 21:36

## 2022-02-25 RX ADMIN — Medication 200 MILLIGRAM(S): at 18:02

## 2022-02-25 RX ADMIN — Medication 4.8 GRAM(S): at 17:41

## 2022-02-25 RX ADMIN — Medication 25 MILLIGRAM(S): at 23:00

## 2022-02-25 RX ADMIN — Medication 500 MILLIGRAM(S): at 04:03

## 2022-02-25 RX ADMIN — BUDESONIDE AND FORMOTEROL FUMARATE DIHYDRATE 2 PUFF(S): 160; 4.5 AEROSOL RESPIRATORY (INHALATION) at 18:01

## 2022-02-25 RX ADMIN — Medication 200 MILLIGRAM(S): at 06:20

## 2022-02-25 RX ADMIN — Medication 125 MICROGRAM(S): at 06:21

## 2022-02-25 RX ADMIN — ESCITALOPRAM OXALATE 20 MILLIGRAM(S): 10 TABLET, FILM COATED ORAL at 11:53

## 2022-02-25 RX ADMIN — BUDESONIDE AND FORMOTEROL FUMARATE DIHYDRATE 2 PUFF(S): 160; 4.5 AEROSOL RESPIRATORY (INHALATION) at 11:57

## 2022-02-25 RX ADMIN — PANTOPRAZOLE SODIUM 40 MILLIGRAM(S): 20 TABLET, DELAYED RELEASE ORAL at 06:21

## 2022-02-25 NOTE — PROGRESS NOTE ADULT - PROBLEM SELECTOR PLAN 2
2/2 Ulcerative colitis   Follows with [facility] Dr. Tromba   - Last colonoscopy one year ago   - CT abdomen: Mild diffuse colonic wall thickening, most pronounced involving the ascending and transverse colon  - hold mesalamine  - f/u stool studies  - f/u procal 2/2 Ulcerative colitis   Follows with [facility] Dr. Tromba   - Last colonoscopy one year ago   - CT abdomen: Mild diffuse colonic wall thickening, most pronounced involving the ascending and transverse colon  - hold mesalamine  - GI PCR not detected   - procal wnl

## 2022-02-25 NOTE — PROGRESS NOTE ADULT - PROBLEM SELECTOR PLAN 7
- DVT ppx: Lovenox   - Diet: Start on clears  - Dispo: home - DVT ppx: Lovenox   - Diet: Low fiber   - Dispo: home

## 2022-02-25 NOTE — PROGRESS NOTE ADULT - PROBLEM SELECTOR PLAN 1
Meets 2/4 SIRS criteria (Temp, HR)  - s/p 1.5L LR in   - s/p metronidazole x 1 and cipro x 1  - Monitor off Abx   - U/A negative   - f/u BCx and UCx Meets 2/4 SIRS criteria (Temp, HR)  - s/p 1.5L LR in   - s/p metronidazole x 1 and cipro x 1  - U/A negative   - f/u BCx and UCx  - Per GI, restart on flagyl and CTX

## 2022-02-25 NOTE — PROGRESS NOTE ADULT - SUBJECTIVE AND OBJECTIVE BOX
PROGRESS NOTE:     CONTACT INFO:  Derick Steward MD | PGY-1  Pager: [phone] [place], [id number] [place]  Also on TEAMS  After 7pm page night float  On weekends after 1pm check resident assignment tab to see who is covering      Patient is a 61y old  Female who presents with a chief complaint of Nausea, vomiting, diarrhea. (2022 07:24)      SUBJECTIVE / OVERNIGHT EVENTS:    - No acute events overnight.   - No fevers/chills.  - Patient seen and evaluated at bedside.  - Denies SOB at rest, chest pain, palpitations, abdominal pain, nausea/vomiting    ADDITIONAL REVIEW OF SYSTEMS:    MEDICATIONS  (STANDING):  budesonide 160 MICROgram(s)/formoterol 4.5 MICROgram(s) Inhaler 2 Puff(s) Inhalation two times a day  ciprofloxacin   IVPB      ciprofloxacin   IVPB 400 milliGRAM(s) IV Intermittent every 12 hours  enoxaparin Injectable 40 milliGRAM(s) SubCutaneous daily  escitalopram 20 milliGRAM(s) Oral daily  levothyroxine 125 MICROGram(s) Oral daily  metroNIDAZOLE    Tablet 500 milliGRAM(s) Oral every 8 hours  pantoprazole    Tablet 40 milliGRAM(s) Oral before breakfast    MEDICATIONS  (PRN):  acetaminophen     Tablet .. 650 milliGRAM(s) Oral every 6 hours PRN Mild Pain (1 - 3), Moderate Pain (4 - 6)  ALBUTerol    90 MICROgram(s) HFA Inhaler 2 Puff(s) Inhalation every 6 hours PRN Shortness of Breath and/or Wheezing  guaifenesin/dextromethorphan Oral Liquid 10 milliLiter(s) Oral every 6 hours PRN Cough  ondansetron   Disintegrating Tablet 4 milliGRAM(s) Oral every 8 hours PRN Nausea and/or Vomiting      CAPILLARY BLOOD GLUCOSE        I&O's Summary    2022 07:01  -  2022 07:00  --------------------------------------------------------  IN: 600 mL / OUT: 0 mL / NET: 600 mL        PHYSICAL EXAM:  Vital Signs Last 24 Hrs  T(C): 36.8 (2022 04:31), Max: 37 (2022 18:12)  T(F): 98.2 (2022 04:31), Max: 98.6 (2022 18:12)  HR: 79 (2022 04:31) (79 - 88)  BP: 104/61 (2022 04:31) (99/64 - 112/67)  BP(mean): --  RR: 18 (2022 04:31) (18 - 18)  SpO2: 98% (2022 04:31) (98% - 100%)    CONSTITUTIONAL: NAD, well-developed  RESPIRATORY: Normal respiratory effort; lungs are clear to auscultation bilaterally  CARDIOVASCULAR: Regular rate and rhythm, normal S1 and S2, no murmur/rub/gallop; No lower extremity edema; Peripheral pulses are 2+ bilaterally  ABDOMEN: Nontender to palpation, normoactive bowel sounds, no rebound/guarding; No hepatosplenomegaly  MUSCULOSKELETAL: no clubbing or cyanosis of digits; no joint swelling or tenderness to palpation  PSYCH: A+O to person, place, and time; affect appropriate    LABS:                        10.4   6.03  )-----------( 315      ( 2022 09:03 )             33.2     02-24    137  |  103  |  8   ----------------------------<  115<H>  3.2<L>   |  22  |  0.99    Ca    9.3      2022 09:03  Phos  2.5     -24  Mg     1.6     -24    TPro  7.5  /  Alb  4.0  /  TBili  0.4  /  DBili  x   /  AST  10  /  ALT  14  /  AlkPhos  93  02-24    PT/INR - ( 2022 00:12 )   PT: 15.1 sec;   INR: 1.26 ratio         PTT - ( 2022 00:12 )  PTT:31.3 sec      Urinalysis Basic - ( 2022 02:42 )    Color: Yellow / Appearance: Slightly Turbid / S.012 / pH: x  Gluc: x / Ketone: Negative  / Bili: Negative / Urobili: Negative   Blood: x / Protein: 30 mg/dL / Nitrite: Negative   Leuk Esterase: Negative / RBC: 4 /hpf / WBC 6 /HPF   Sq Epi: x / Non Sq Epi: 4 / Bacteria: Negative        GI PCR Panel, Stool (collected 2022 18:57)  Source: .Stool Feces  Final Report (2022 22:53):    GI PCR Results: NOT detected    *******Please Note:*******    GI panel PCR evaluates for:    Campylobacter, Plesiomonas shigelloides, Salmonella,    Vibrio, Yersinia enterocolitica, Enteroaggregative    Escherichia coli (EAEC), Enteropathogenic E.coli (EPEC),    Enterotoxigenic E. coli (ETEC) lt/st, Shiga-like    toxin-producing E. coli (STEC) stx1/stx2,    Shigella/ Enteroinvasive E. coli (EIEC), Cryptosporidium,    Cyclospora cayetanensis, Entamoeba histolytica,    Giardia lamblia, Adenovirus F 40/41, Astrovirus,    Norovirus GI/GII, Rotavirus A, Sapovirus    Culture - Blood (collected 2022 03:53)  Source: .Blood Blood-Peripheral  Preliminary Report (2022 04:01):    No growth to date.        RADIOLOGY & ADDITIONAL TESTS:  Results Reviewed:   Imaging Personally Reviewed:  Electrocardiogram Personally Reviewed:    COORDINATION OF CARE:  Care Discussed with Consultants/Other Providers [Y/N]: Y  Prior or Outpatient Records Reviewed [Y/N]: Y   PROGRESS NOTE:     CONTACT INFO:  Derick Steward MD | PGY-1  Pager: [phone] [place], [id number] [place]  Also on TEAMS  After 7pm page night float  On weekends after 1pm check resident assignment tab to see who is covering      Patient is a 61y old  Female who presents with a chief complaint of Nausea, vomiting, diarrhea. (2022 07:24)      SUBJECTIVE / OVERNIGHT EVENTS:    - 5 Bm overnight   - No fevers/chills.  - Patient seen and evaluated at bedside.  - Denies SOB at rest, chest pain, palpitations, abdominal pain, nausea/vomiting    ADDITIONAL REVIEW OF SYSTEMS:    MEDICATIONS  (STANDING):  budesonide 160 MICROgram(s)/formoterol 4.5 MICROgram(s) Inhaler 2 Puff(s) Inhalation two times a day  ciprofloxacin   IVPB      ciprofloxacin   IVPB 400 milliGRAM(s) IV Intermittent every 12 hours  enoxaparin Injectable 40 milliGRAM(s) SubCutaneous daily  escitalopram 20 milliGRAM(s) Oral daily  levothyroxine 125 MICROGram(s) Oral daily  metroNIDAZOLE    Tablet 500 milliGRAM(s) Oral every 8 hours  pantoprazole    Tablet 40 milliGRAM(s) Oral before breakfast    MEDICATIONS  (PRN):  acetaminophen     Tablet .. 650 milliGRAM(s) Oral every 6 hours PRN Mild Pain (1 - 3), Moderate Pain (4 - 6)  ALBUTerol    90 MICROgram(s) HFA Inhaler 2 Puff(s) Inhalation every 6 hours PRN Shortness of Breath and/or Wheezing  guaifenesin/dextromethorphan Oral Liquid 10 milliLiter(s) Oral every 6 hours PRN Cough  ondansetron   Disintegrating Tablet 4 milliGRAM(s) Oral every 8 hours PRN Nausea and/or Vomiting      CAPILLARY BLOOD GLUCOSE        I&O's Summary    2022 07:01  -  2022 07:00  --------------------------------------------------------  IN: 600 mL / OUT: 0 mL / NET: 600 mL        PHYSICAL EXAM:  Vital Signs Last 24 Hrs  T(C): 36.8 (2022 04:31), Max: 37 (2022 18:12)  T(F): 98.2 (2022 04:31), Max: 98.6 (2022 18:12)  HR: 79 (2022 04:31) (79 - 88)  BP: 104/61 (2022 04:31) (99/64 - 112/67)  BP(mean): --  RR: 18 (2022 04:31) (18 - 18)  SpO2: 98% (2022 04:31) (98% - 100%)    CONSTITUTIONAL: NAD, well-developed  RESPIRATORY: Normal respiratory effort; lungs are clear to auscultation bilaterally  CARDIOVASCULAR: Regular rate and rhythm, normal S1 and S2, no murmur/rub/gallop;   ABDOMEN: Nontender to palpation, normoactive bowel sounds, no rebound/guarding; No hepatosplenomegaly  MUSCULOSKELETAL: no clubbing or cyanosis of digits; no joint swelling or tenderness to palpation  GI: Diarrhea   PSYCH: A+O to person, place, and time; affect appropriate    LABS:                        10.4   6.03  )-----------( 315      ( 2022 09:03 )             33.2     02-24    137  |  103  |  8   ----------------------------<  115<H>  3.2<L>   |  22  |  0.99    Ca    9.3      2022 09:03  Phos  2.5     02-24  Mg     1.6     -24    TPro  7.5  /  Alb  4.0  /  TBili  0.4  /  DBili  x   /  AST  10  /  ALT  14  /  AlkPhos  93  02-24    PT/INR - ( 2022 00:12 )   PT: 15.1 sec;   INR: 1.26 ratio         PTT - ( 2022 00:12 )  PTT:31.3 sec      Urinalysis Basic - ( 2022 02:42 )    Color: Yellow / Appearance: Slightly Turbid / S.012 / pH: x  Gluc: x / Ketone: Negative  / Bili: Negative / Urobili: Negative   Blood: x / Protein: 30 mg/dL / Nitrite: Negative   Leuk Esterase: Negative / RBC: 4 /hpf / WBC 6 /HPF   Sq Epi: x / Non Sq Epi: 4 / Bacteria: Negative        GI PCR Panel, Stool (collected 2022 18:57)  Source: .Stool Feces  Final Report (2022 22:53):    GI PCR Results: NOT detected    *******Please Note:*******    GI panel PCR evaluates for:    Campylobacter, Plesiomonas shigelloides, Salmonella,    Vibrio, Yersinia enterocolitica, Enteroaggregative    Escherichia coli (EAEC), Enteropathogenic E.coli (EPEC),    Enterotoxigenic E. coli (ETEC) lt/st, Shiga-like    toxin-producing E. coli (STEC) stx1/stx2,    Shigella/ Enteroinvasive E. coli (EIEC), Cryptosporidium,    Cyclospora cayetanensis, Entamoeba histolytica,    Giardia lamblia, Adenovirus F 40/41, Astrovirus,    Norovirus GI/GII, Rotavirus A, Sapovirus    Culture - Blood (collected 2022 03:53)  Source: .Blood Blood-Peripheral  Preliminary Report (2022 04:01):    No growth to date.        RADIOLOGY & ADDITIONAL TESTS:  Results Reviewed:   Imaging Personally Reviewed:  Electrocardiogram Personally Reviewed:    COORDINATION OF CARE:  Care Discussed with Consultants/Other Providers [Y/N]: Y  Prior or Outpatient Records Reviewed [Y/N]: Y

## 2022-02-25 NOTE — PROGRESS NOTE ADULT - ASSESSMENT
61F with h/o pancolitis presents with one week of abdominal discomfort and NBNB diarrhea.  61F with h/o pancolitis presents with one week of abdominal discomfort and NBNB diarrhea. GI consulted for medication management

## 2022-02-25 NOTE — CONSULT NOTE ADULT - SUBJECTIVE AND OBJECTIVE BOX
Chief Complaint:  Patient is a 61y old  Female who presents with a chief complaint of Nausea, vomiting, diarrhea. (2022 07:02)      Date of service: 22 @ 15:59    HPI:    The patient is a     The patient denies dysphagia, nausea and vomiting, abdominal pain, diarrhea, unintentional weight loss, change in bowel habits or NSAID use.      Allergies:  No Known Allergies      Home Medications:    Hospital Medications:  acetaminophen     Tablet .. 650 milliGRAM(s) Oral every 6 hours PRN  ALBUTerol    90 MICROgram(s) HFA Inhaler 2 Puff(s) Inhalation every 6 hours PRN  budesonide 160 MICROgram(s)/formoterol 4.5 MICROgram(s) Inhaler 2 Puff(s) Inhalation two times a day  enoxaparin Injectable 40 milliGRAM(s) SubCutaneous daily  escitalopram 20 milliGRAM(s) Oral daily  guaifenesin/dextromethorphan Oral Liquid 10 milliLiter(s) Oral every 6 hours PRN  levothyroxine 125 MICROGram(s) Oral daily  ondansetron   Disintegrating Tablet 4 milliGRAM(s) Oral every 8 hours PRN  pantoprazole    Tablet 40 milliGRAM(s) Oral before breakfast      PMHX/PSHX:  Hypothyroidism    Asthma    Other depression    GERD (gastroesophageal reflux disease)    H/O tubal ligation        Family history:  FHx: non-Hodgkin&#x27;s lymphoma (Mother)    Family history of type 2 diabetes mellitus in mother (Mother, Father)        Social History:   Denies ethanol use.  Denies illicit drug use.    ROS:     General:  No wt loss, fevers, chills, night sweats, fatigue,   Eyes:  Good vision, no reported pain  ENT:  No sore throat, pain, runny nose, dysphagia  CV:  No pain, palpitations, hypo/hypertension  Resp:  No dyspnea, cough, tachypnea, wheezing  GI:  See HPI  :  No pain, bleeding, incontinence, nocturia  Muscle:  No pain, weakness  Neuro:  No weakness, tingling, memory problems  Psych:  No fatigue, insomnia, mood problems, depression  Endocrine:  No polyuria, polydipsia, cold/heat intolerance  Heme:  No petechiae, ecchymosis, easy bruisability  Integumentary:  No rash, edema      PHYSICAL EXAM:     GENERAL:  Appears stated age, well-groomed, well-nourished, no distress  HEENT:  NC/AT,  conjunctivae anicteric, clear and pink,   NECK: supple, trachea midline  CHEST:  Full & symmetric excursion, no increased effort, breath sounds clear  HEART:  Regular rhythm, no JVD  ABDOMEN:  Soft, non-tender, non-distended, normoactive bowel sounds,  no masses , no hepatosplenomegaly  EXTREMITIES:  no cyanosis,clubbing or edema  SKIN:  No rash, erythema, or, ecchymoses, no jaundice  NEURO:  Alert, non-focal, no asterixis  PSYCH: Appropriate affect, oriented to place and time  RECTAL: Deferred      Vital Signs:  Vital Signs Last 24 Hrs  T(C): 36.8 (2022 13:17), Max: 37 (2022 18:12)  T(F): 98.3 (2022 13:17), Max: 98.6 (2022 18:12)  HR: 85 (2022 13:17) (79 - 88)  BP: 118/57 (2022 13:17) (104/61 - 118/57)  BP(mean): --  RR: 18 (2022 13:17) (18 - 18)  SpO2: 99% (2022 13:17) (98% - 99%)  Daily     Daily     LABS: Labs personally reviewed by me:                        9.9    5.08  )-----------( 307      ( 2022 07:05 )             31.3     -    139  |  106  |  5<L>  ----------------------------<  116<H>  3.7   |  20<L>  |  0.91    Ca    9.4      2022 07:05  Phos  2.7     02-  Mg     1.6     -    TPro  6.1  /  Alb  3.4  /  TBili  0.1<L>  /  DBili  x   /  AST  11  /  ALT  12  /  AlkPhos  74  02-25    LIVER FUNCTIONS - ( 2022 07:05 )  Alb: 3.4 g/dL / Pro: 6.1 g/dL / ALK PHOS: 74 U/L / ALT: 12 U/L / AST: 11 U/L / GGT: x           PT/INR - ( 2022 00:12 )   PT: 15.1 sec;   INR: 1.26 ratio         PTT - ( 2022 00:12 )  PTT:31.3 sec  Urinalysis Basic - ( 2022 02:42 )    Color: Yellow / Appearance: Slightly Turbid / S.012 / pH: x  Gluc: x / Ketone: Negative  / Bili: Negative / Urobili: Negative   Blood: x / Protein: 30 mg/dL / Nitrite: Negative   Leuk Esterase: Negative / RBC: 4 /hpf / WBC 6 /HPF   Sq Epi: x / Non Sq Epi: 4 / Bacteria: Negative          Imaging personally reviewed by me:           Chief Complaint:  Patient is a 61y old  Female who presents with a chief complaint of Nausea, vomiting, diarrhea. (2022 07:02)      Date of service: 22 @ 15:59    HPI:    The patient is a 61 year old female with history of pancolitis and asthma presenting with nonbloody loose stools, nausea and vomiting since Wednesday. Seen in  for pancolitis; was having bloody stools at that time. For this admission, patient states she ate beef tacos on Wednesday and has since had green loose stools with nausea and vomiting. Last vomited yesterday. Nausea improving. Endorses diffuse abdominal pain. Had intermittent chills, fevers and weakness. Tachycardic to 110's and febrile 100.4 in ED. Symptoms now improving. Endorses poor appetite since diarrhea, n/v started. Last colonoscopy in 2020 with GI Dr. Hernandez; told she had some polyps. Endoscopy "years ago", told was normal. Denies dysphagia, NSAID use, AC use, unintentional weight loss. On Mesalamine for 2 weeks. Sees Dr. Hernandez.          Allergies:  No Known Allergies      Home Medications:    Hospital Medications:  acetaminophen     Tablet .. 650 milliGRAM(s) Oral every 6 hours PRN  ALBUTerol    90 MICROgram(s) HFA Inhaler 2 Puff(s) Inhalation every 6 hours PRN  budesonide 160 MICROgram(s)/formoterol 4.5 MICROgram(s) Inhaler 2 Puff(s) Inhalation two times a day  enoxaparin Injectable 40 milliGRAM(s) SubCutaneous daily  escitalopram 20 milliGRAM(s) Oral daily  guaifenesin/dextromethorphan Oral Liquid 10 milliLiter(s) Oral every 6 hours PRN  levothyroxine 125 MICROGram(s) Oral daily  ondansetron   Disintegrating Tablet 4 milliGRAM(s) Oral every 8 hours PRN  pantoprazole    Tablet 40 milliGRAM(s) Oral before breakfast      PMHX/PSHX:  Hypothyroidism    Asthma    Other depression    GERD (gastroesophageal reflux disease)    H/O tubal ligation        Family history:  FHx: non-Hodgkin&#x27;s lymphoma (Mother)    Family history of type 2 diabetes mellitus in mother (Mother, Father)        Social History:   Denies ethanol use.  Denies illicit drug use.    ROS:     General:  No wt loss, fevers, chills, night sweats, fatigue,   Eyes:  Good vision, no reported pain  ENT:  No sore throat, pain, runny nose, dysphagia  CV:  No pain, palpitations, hypo/hypertension  Resp:  No dyspnea, cough, tachypnea, wheezing  GI:  See HPI  :  No pain, bleeding, incontinence, nocturia  Muscle:  No pain, weakness  Neuro:  No weakness, tingling, memory problems  Psych:  No fatigue, insomnia, mood problems, depression  Endocrine:  No polyuria, polydipsia, cold/heat intolerance  Heme:  No petechiae, ecchymosis, easy bruisability  Integumentary:  No rash, edema      PHYSICAL EXAM:     GENERAL:  Appears stated age, well-groomed, well-nourished, no distress  HEENT:  NC/AT,  conjunctivae anicteric, clear and pink,   NECK: supple, trachea midline  CHEST:  Full & symmetric excursion, no increased effort, breath sounds clear  HEART:  Regular rhythm, no JVD  ABDOMEN:  Soft, non-tender, non-distended, normoactive bowel sounds,  no masses , no hepatosplenomegaly  EXTREMITIES:  no cyanosis,clubbing or edema  SKIN:  No rash, erythema, or, ecchymoses, no jaundice  NEURO:  Alert, non-focal, no asterixis  PSYCH: Appropriate affect, oriented to place and time  RECTAL: Deferred      Vital Signs:  Vital Signs Last 24 Hrs  T(C): 36.8 (2022 13:17), Max: 37 (2022 18:12)  T(F): 98.3 (2022 13:17), Max: 98.6 (2022 18:12)  HR: 85 (2022 13:17) (79 - 88)  BP: 118/57 (2022 13:17) (104/61 - 118/57)  BP(mean): --  RR: 18 (2022 13:17) (18 - 18)  SpO2: 99% (2022 13:17) (98% - 99%)  Daily     Daily     LABS: Labs personally reviewed by me:                        9.9    5.08  )-----------( 307      ( 2022 07:05 )             31.3         139  |  106  |  5<L>  ----------------------------<  116<H>  3.7   |  20<L>  |  0.91    Ca    9.4      2022 07:05  Phos  2.7       Mg     1.6         TPro  6.1  /  Alb  3.4  /  TBili  0.1<L>  /  DBili  x   /  AST  11  /  ALT  12  /  AlkPhos  74      LIVER FUNCTIONS - ( 2022 07:05 )  Alb: 3.4 g/dL / Pro: 6.1 g/dL / ALK PHOS: 74 U/L / ALT: 12 U/L / AST: 11 U/L / GGT: x           PT/INR - ( 2022 00:12 )   PT: 15.1 sec;   INR: 1.26 ratio         PTT - ( 2022 00:12 )  PTT:31.3 sec  Urinalysis Basic - ( 2022 02:42 )    Color: Yellow / Appearance: Slightly Turbid / S.012 / pH: x  Gluc: x / Ketone: Negative  / Bili: Negative / Urobili: Negative   Blood: x / Protein: 30 mg/dL / Nitrite: Negative   Leuk Esterase: Negative / RBC: 4 /hpf / WBC 6 /HPF   Sq Epi: x / Non Sq Epi: 4 / Bacteria: Negative          Imaging personally reviewed by me:

## 2022-02-25 NOTE — PROGRESS NOTE ADULT - PROBLEM SELECTOR PLAN 3
Baseline creatinine: 0.89 Today’s creatinine:1.24 but downtrending    ANGEL likely due to low PO intake   Trend BUN/Cr Baseline creatinine: 0.89 Today’s creatinine: 0.91  ANGEL likely due to low PO intake   Trend BUN/Cr

## 2022-02-25 NOTE — CHART NOTE - NSCHARTNOTEFT_GEN_A_CORE
Called regarding possible allergic reaction to medication. Was told that while patient was receiving first dose of ciprofloxacin on L hand, patient developed erythema approx 5 cm from the IV site w/ notable red streak. Infusion was stopped.   The patient reported mild pain and pruritus at site of erythema however patient denied any difficulty breathing or erythema in her upper airway. On examination, patient's rash and swelling had disappeared (evaluated about 20 minutes after incident). Patient had a picture of the rash, which did appear to have erythema. Line was not noted be actively infiltrating given that metronidazole was being administered through that line without issue.   Spoke with pharmacy regarding this reaction, and they noted that local hypersensitivity reaction is not common for ciprofloxacin, and were doubtful that this was a true reaction to ciprofloxacin. Spoke with patient who agreed to get an IV line on R arm and retry infusion of ciprofloxacin, with careful instructions that if the erythema did recure we would stop the infusion, and try alternative medication to treat patient's pancolitis. Patient was agreeable to plan.     Will closely monitor patient for hypersensitivity reaction overnight.

## 2022-02-25 NOTE — CONSULT NOTE ADULT - ASSESSMENT
The patient is a 61 year old female with history of pancolitis and asthma presenting with nonbloody loose stools, nausea and vomiting since Wednesday.     1. Pancolitis  -IV cipro and flagyl  -continue mesalamine   -GI PCR negative, pending culture and O&P  -check stool for c-diff    2. Nausea  -cw prn antiemetics     3. Hypothyroid  -on levothyroxine          Attending supervision statement: I have personally seen and examined the patient. I fully participated in the care of this patient. I have made amendments to the documentation where necessary, and agree with the history, physical exam, and plan as outlined by the ACP. The patient is a 61 year old female with history of pancolitis and asthma presenting with nonbloody loose stools, nausea and vomiting since Wednesday.     1. Pancolitis  -IV cipro and flagyl  -continue mesalamine   -GI PCR negative, pending culture and O&P  -check stool for c-diff    2. Nausea  -cw prn antiemetics    3. Fevers, resolved  -BC NGTD     4. Hypothyroid  -on levothyroxine        Advanced care planning forms were discussed. Code status including forceful chest compressions, defibrillation and intubation were discussed. The risks benefits and alternatives to pertinent gastrointestinal procedures and interventions were discussed in detail and all questions were answered. Duration: 15 Minutes.      Attending supervision statement: I have personally seen and examined the patient. I fully participated in the care of this patient. I have made amendments to the documentation where necessary, and agree with the history, physical exam, and plan as outlined by the ACP. The patient is a 61 year old female with history of pancolitis and asthma presenting with nonbloody loose stools, nausea and vomiting since Wednesday.     1. Pancolitis. Suspect exacerbation of underlying UC.  -IV cipro and flagyl  -continue mesalamine   -GI PCR negative, pending culture and O&P  -check stool for c-diff  -will touch base with Dr. Hernandez, suspect pt will need steroids.    2. Nausea  -cw prn antiemetics    3. Fevers, resolved  -BC NGTD     4. Hypothyroid  -on levothyroxine        Advanced care planning forms were discussed. Code status including forceful chest compressions, defibrillation and intubation were discussed. The risks benefits and alternatives to pertinent gastrointestinal procedures and interventions were discussed in detail and all questions were answered. Duration: 15 Minutes.      Attending supervision statement: I have personally seen and examined the patient. I fully participated in the care of this patient. I have made amendments to the documentation where necessary, and agree with the history, physical exam, and plan as outlined by the ACP.

## 2022-02-26 ENCOUNTER — TRANSCRIPTION ENCOUNTER (OUTPATIENT)
Age: 62
End: 2022-02-26

## 2022-02-26 LAB
ALBUMIN SERPL ELPH-MCNC: 3.5 G/DL — SIGNIFICANT CHANGE UP (ref 3.3–5)
ALP SERPL-CCNC: 75 U/L — SIGNIFICANT CHANGE UP (ref 40–120)
ALT FLD-CCNC: 11 U/L — SIGNIFICANT CHANGE UP (ref 10–45)
ANION GAP SERPL CALC-SCNC: 12 MMOL/L — SIGNIFICANT CHANGE UP (ref 5–17)
AST SERPL-CCNC: 11 U/L — SIGNIFICANT CHANGE UP (ref 10–40)
BILIRUB SERPL-MCNC: 0.1 MG/DL — LOW (ref 0.2–1.2)
BUN SERPL-MCNC: 6 MG/DL — LOW (ref 7–23)
CALCIUM SERPL-MCNC: 9.5 MG/DL — SIGNIFICANT CHANGE UP (ref 8.4–10.5)
CHLORIDE SERPL-SCNC: 107 MMOL/L — SIGNIFICANT CHANGE UP (ref 96–108)
CO2 SERPL-SCNC: 22 MMOL/L — SIGNIFICANT CHANGE UP (ref 22–31)
CREAT SERPL-MCNC: 0.85 MG/DL — SIGNIFICANT CHANGE UP (ref 0.5–1.3)
CULTURE RESULTS: SIGNIFICANT CHANGE UP
CULTURE RESULTS: SIGNIFICANT CHANGE UP
GLUCOSE SERPL-MCNC: 117 MG/DL — HIGH (ref 70–99)
HCT VFR BLD CALC: 32.2 % — LOW (ref 34.5–45)
HGB BLD-MCNC: 10.2 G/DL — LOW (ref 11.5–15.5)
MAGNESIUM SERPL-MCNC: 1.6 MG/DL — SIGNIFICANT CHANGE UP (ref 1.6–2.6)
MCHC RBC-ENTMCNC: 24.9 PG — LOW (ref 27–34)
MCHC RBC-ENTMCNC: 31.7 GM/DL — LOW (ref 32–36)
MCV RBC AUTO: 78.7 FL — LOW (ref 80–100)
NRBC # BLD: 0 /100 WBCS — SIGNIFICANT CHANGE UP (ref 0–0)
PHOSPHATE SERPL-MCNC: 3.3 MG/DL — SIGNIFICANT CHANGE UP (ref 2.5–4.5)
PLATELET # BLD AUTO: 361 K/UL — SIGNIFICANT CHANGE UP (ref 150–400)
POTASSIUM SERPL-MCNC: 3.6 MMOL/L — SIGNIFICANT CHANGE UP (ref 3.5–5.3)
POTASSIUM SERPL-SCNC: 3.6 MMOL/L — SIGNIFICANT CHANGE UP (ref 3.5–5.3)
PROT SERPL-MCNC: 6.3 G/DL — SIGNIFICANT CHANGE UP (ref 6–8.3)
RBC # BLD: 4.09 M/UL — SIGNIFICANT CHANGE UP (ref 3.8–5.2)
RBC # FLD: 14.1 % — SIGNIFICANT CHANGE UP (ref 10.3–14.5)
SODIUM SERPL-SCNC: 141 MMOL/L — SIGNIFICANT CHANGE UP (ref 135–145)
SPECIMEN SOURCE: SIGNIFICANT CHANGE UP
SPECIMEN SOURCE: SIGNIFICANT CHANGE UP
WBC # BLD: 5.97 K/UL — SIGNIFICANT CHANGE UP (ref 3.8–10.5)
WBC # FLD AUTO: 5.97 K/UL — SIGNIFICANT CHANGE UP (ref 3.8–10.5)

## 2022-02-26 PROCEDURE — 99232 SBSQ HOSP IP/OBS MODERATE 35: CPT

## 2022-02-26 RX ADMIN — Medication 20 MILLIGRAM(S): at 21:47

## 2022-02-26 RX ADMIN — ENOXAPARIN SODIUM 40 MILLIGRAM(S): 100 INJECTION SUBCUTANEOUS at 12:40

## 2022-02-26 RX ADMIN — BUDESONIDE AND FORMOTEROL FUMARATE DIHYDRATE 2 PUFF(S): 160; 4.5 AEROSOL RESPIRATORY (INHALATION) at 06:08

## 2022-02-26 RX ADMIN — Medication 125 MICROGRAM(S): at 06:02

## 2022-02-26 RX ADMIN — Medication 100 MILLIGRAM(S): at 06:01

## 2022-02-26 RX ADMIN — Medication 20 MILLIGRAM(S): at 14:19

## 2022-02-26 RX ADMIN — PANTOPRAZOLE SODIUM 40 MILLIGRAM(S): 20 TABLET, DELAYED RELEASE ORAL at 06:02

## 2022-02-26 RX ADMIN — ESCITALOPRAM OXALATE 20 MILLIGRAM(S): 10 TABLET, FILM COATED ORAL at 12:40

## 2022-02-26 NOTE — DISCHARGE NOTE PROVIDER - NSDCCPTREATMENT_GEN_ALL_CORE_FT
PRINCIPAL PROCEDURE  Procedure: CT of abdomen and pelvis with contrast  Findings and Treatment: FINDINGS:  LOWER CHEST: Small fat-containing right Bochdalek diaphragmatic hernia  LIVER: Mild hepatomegaly.  BILE DUCTS: Normal caliber.  GALLBLADDER: Within normal limits.  SPLEEN: Within normal limits.  PANCREAS: Within normal limits.  ADRENALS: Within normal limits.  KIDNEYS/URETERS: Left renal cyst. No hydronephrosis. Homogeneous   symmetric renal enhancement.  BLADDER: Minimally distended.  REPRODUCTIVE ORGANS: Uterus and adnexa within normal limits.  BOWEL: No bowel obstruction. Appendix is normal. Colonic diverticulosis.   Mild diffuse colonic wall thickening, most pronounced involving the   ascending and transverse colon. Submucosal fat deposition along the   terminal ileum likely reflects sequela of priorinflammation.  PERITONEUM: No ascites.  VESSELS: Atherosclerotic changes.  RETROPERITONEUM/LYMPH NODES: No lymphadenopathy. Stable mildly prominent   but subcentimeter aortocaval nodes.  ABDOMINAL WALL: Within normal limits.  BONES: Degenerative changes with revisualized air posterior to the L2-3   disc space. Stable grade 1 listhesis at L2-L3.  IMPRESSION:  Pancolitis.

## 2022-02-26 NOTE — PROGRESS NOTE ADULT - SUBJECTIVE AND OBJECTIVE BOX
Roula Betancourt MD   [facility] Medicine  Pager [phone]      PROGRESS NOTE:     Patient is a 61y old  Female who presents with a chief complaint of Nausea, vomiting, diarrhea. (26 Feb 2022 08:13)      SUBJECTIVE / OVERNIGHT EVENTS:    No acute events overnight. Patient examined at bedside with no acute complaints.     Denies SOB, chest pain, nausea, vomiting, diarrhea, constipation.    MEDICATIONS  (STANDING):  budesonide 160 MICROgram(s)/formoterol 4.5 MICROgram(s) Inhaler 2 Puff(s) Inhalation two times a day  enoxaparin Injectable 40 milliGRAM(s) SubCutaneous daily  escitalopram 20 milliGRAM(s) Oral daily  levothyroxine 125 MICROGram(s) Oral daily  methylPREDNISolone sodium succinate Injectable 20 milliGRAM(s) IV Push every 8 hours  pantoprazole    Tablet 40 milliGRAM(s) Oral before breakfast    MEDICATIONS  (PRN):  acetaminophen     Tablet .. 650 milliGRAM(s) Oral every 6 hours PRN Mild Pain (1 - 3), Moderate Pain (4 - 6)  ALBUTerol    90 MICROgram(s) HFA Inhaler 2 Puff(s) Inhalation every 6 hours PRN Shortness of Breath and/or Wheezing  guaifenesin/dextromethorphan Oral Liquid 10 milliLiter(s) Oral every 6 hours PRN Cough  ondansetron   Disintegrating Tablet 4 milliGRAM(s) Oral every 8 hours PRN Nausea and/or Vomiting      CAPILLARY BLOOD GLUCOSE        I&O's Summary      PHYSICAL EXAM:  Vital Signs Last 24 Hrs  T(C): 36.8 (26 Feb 2022 04:20), Max: 37.1 (25 Feb 2022 17:02)  T(F): 98.3 (26 Feb 2022 04:20), Max: 98.8 (25 Feb 2022 21:27)  HR: 78 (26 Feb 2022 04:20) (56 - 88)  BP: 99/59 (26 Feb 2022 04:20) (99/59 - 127/80)  BP(mean): --  RR: 18 (26 Feb 2022 04:20) (18 - 18)  SpO2: 95% (26 Feb 2022 04:20) (95% - 99%)    CONSTITUTIONAL: NAD, well-developed  RESPIRATORY: Normal respiratory effort; lungs are clear to auscultation bilaterally  CARDIOVASCULAR: Regular rate and rhythm, normal S1 and S2, no murmur/rub/gallop; No lower extremity edema; Peripheral pulses are 2+ bilaterally  ABDOMEN: Nontender to palpation, normoactive bowel sounds, no rebound/guarding; No hepatosplenomegaly  MUSCLOSKELETAL: no clubbing or cyanosis of digits; no joint swelling or tenderness to palpation  PSYCH: A+O to person, place, and time; affect appropriate    LABS:                        10.2   5.97  )-----------( 361      ( 26 Feb 2022 06:50 )             32.2     02-26    141  |  107  |  6<L>  ----------------------------<  117<H>  3.6   |  22  |  0.85    Ca    9.5      26 Feb 2022 06:49  Phos  3.3     02-26  Mg     1.6     02-26    TPro  6.3  /  Alb  3.5  /  TBili  0.1<L>  /  DBili  x   /  AST  11  /  ALT  11  /  AlkPhos  75  02-26              Culture - Stool (collected 24 Feb 2022 18:57)  Source: .Stool Feces  Preliminary Report (25 Feb 2022 21:05):    No enteric pathogens to date: Final culture pending    GI PCR Panel, Stool (collected 24 Feb 2022 18:57)  Source: .Stool Feces  Final Report (24 Feb 2022 22:53):    GI PCR Results: NOT detected    *******Please Note:*******    GI panel PCR evaluates for:    Campylobacter, Plesiomonas shigelloides, Salmonella,    Vibrio, Yersinia enterocolitica, Enteroaggregative    Escherichia coli (EAEC), Enteropathogenic E.coli (EPEC),    Enterotoxigenic E. coli (ETEC) lt/st, Shiga-like    toxin-producing E. coli (STEC) stx1/stx2,    Shigella/ Enteroinvasive E. coli (EIEC), Cryptosporidium,    Cyclospora cayetanensis, Entamoeba histolytica,    Giardia lamblia, Adenovirus F 40/41, Astrovirus,    Norovirus GI/GII, Rotavirus A, Sapovirus    Culture - Urine (collected 24 Feb 2022 09:20)  Source: Clean Catch Clean Catch (Midstream)  Final Report (26 Feb 2022 10:02):    >=3 organisms. Probable collection contamination.    Culture - Blood (collected 24 Feb 2022 08:25)  Source: .Blood Blood-Peripheral  Preliminary Report (25 Feb 2022 09:01):    No growth to date.    Culture - Blood (collected 24 Feb 2022 03:53)  Source: .Blood Blood-Peripheral  Preliminary Report (25 Feb 2022 04:01):    No growth to date.        RADIOLOGY & ADDITIONAL TESTS:  Results Reviewed:   Imaging Personally Reviewed:  Electrocardiogram Personally Reviewed:    COORDINATION OF CARE:  Care Discussed with Consultants/Other Providers [Y/N]:  Prior or Outpatient Records Reviewed [Y/N]:   Roula Betancourt MD   [facility] Medicine  Pager [phone]      PROGRESS NOTE:     Patient is a 61y old  Female who presents with a chief complaint of Nausea, vomiting, diarrhea. (26 Feb 2022 08:13)      SUBJECTIVE / OVERNIGHT EVENTS:    No acute events overnight. Patient reports persistent diarrhea overnight - approximately 5 watery BMs. Patient able to tolerate PO.    Denies SOB, chest pain, nausea, vomiting, constipation.    MEDICATIONS  (STANDING):  budesonide 160 MICROgram(s)/formoterol 4.5 MICROgram(s) Inhaler 2 Puff(s) Inhalation two times a day  enoxaparin Injectable 40 milliGRAM(s) SubCutaneous daily  escitalopram 20 milliGRAM(s) Oral daily  levothyroxine 125 MICROGram(s) Oral daily  methylPREDNISolone sodium succinate Injectable 20 milliGRAM(s) IV Push every 8 hours  pantoprazole    Tablet 40 milliGRAM(s) Oral before breakfast    MEDICATIONS  (PRN):  acetaminophen     Tablet .. 650 milliGRAM(s) Oral every 6 hours PRN Mild Pain (1 - 3), Moderate Pain (4 - 6)  ALBUTerol    90 MICROgram(s) HFA Inhaler 2 Puff(s) Inhalation every 6 hours PRN Shortness of Breath and/or Wheezing  guaifenesin/dextromethorphan Oral Liquid 10 milliLiter(s) Oral every 6 hours PRN Cough  ondansetron   Disintegrating Tablet 4 milliGRAM(s) Oral every 8 hours PRN Nausea and/or Vomiting      CAPILLARY BLOOD GLUCOSE        I&O's Summary      PHYSICAL EXAM:  Vital Signs Last 24 Hrs  T(C): 36.8 (26 Feb 2022 04:20), Max: 37.1 (25 Feb 2022 17:02)  T(F): 98.3 (26 Feb 2022 04:20), Max: 98.8 (25 Feb 2022 21:27)  HR: 78 (26 Feb 2022 04:20) (56 - 88)  BP: 99/59 (26 Feb 2022 04:20) (99/59 - 127/80)  BP(mean): --  RR: 18 (26 Feb 2022 04:20) (18 - 18)  SpO2: 95% (26 Feb 2022 04:20) (95% - 99%)    CONSTITUTIONAL: NAD, well-developed  RESPIRATORY: Normal respiratory effort; lungs are clear to auscultation bilaterally  CARDIOVASCULAR: Regular rate and rhythm, normal S1 and S2, no murmur/rub/gallop; No lower extremity edema; Peripheral pulses are 2+ bilaterally  ABDOMEN: Nontender to palpation, normoactive bowel sounds, no rebound/guarding; No hepatosplenomegaly  MUSCLOSKELETAL: no clubbing or cyanosis of digits; no joint swelling or tenderness to palpation  PSYCH: A+O to person, place, and time; affect appropriate    LABS:                        10.2   5.97  )-----------( 361      ( 26 Feb 2022 06:50 )             32.2     02-26    141  |  107  |  6<L>  ----------------------------<  117<H>  3.6   |  22  |  0.85    Ca    9.5      26 Feb 2022 06:49  Phos  3.3     02-26  Mg     1.6     02-26    TPro  6.3  /  Alb  3.5  /  TBili  0.1<L>  /  DBili  x   /  AST  11  /  ALT  11  /  AlkPhos  75  02-26              Culture - Stool (collected 24 Feb 2022 18:57)  Source: .Stool Feces  Preliminary Report (25 Feb 2022 21:05):    No enteric pathogens to date: Final culture pending    GI PCR Panel, Stool (collected 24 Feb 2022 18:57)  Source: .Stool Feces  Final Report (24 Feb 2022 22:53):    GI PCR Results: NOT detected    *******Please Note:*******    GI panel PCR evaluates for:    Campylobacter, Plesiomonas shigelloides, Salmonella,    Vibrio, Yersinia enterocolitica, Enteroaggregative    Escherichia coli (EAEC), Enteropathogenic E.coli (EPEC),    Enterotoxigenic E. coli (ETEC) lt/st, Shiga-like    toxin-producing E. coli (STEC) stx1/stx2,    Shigella/ Enteroinvasive E. coli (EIEC), Cryptosporidium,    Cyclospora cayetanensis, Entamoeba histolytica,    Giardia lamblia, Adenovirus F 40/41, Astrovirus,    Norovirus GI/GII, Rotavirus A, Sapovirus    Culture - Urine (collected 24 Feb 2022 09:20)  Source: Clean Catch Clean Catch (Midstream)  Final Report (26 Feb 2022 10:02):    >=3 organisms. Probable collection contamination.    Culture - Blood (collected 24 Feb 2022 08:25)  Source: .Blood Blood-Peripheral  Preliminary Report (25 Feb 2022 09:01):    No growth to date.    Culture - Blood (collected 24 Feb 2022 03:53)  Source: .Blood Blood-Peripheral  Preliminary Report (25 Feb 2022 04:01):    No growth to date.        RADIOLOGY & ADDITIONAL TESTS:  Results Reviewed:   Imaging Personally Reviewed:  Electrocardiogram Personally Reviewed:    COORDINATION OF CARE:  Care Discussed with Consultants/Other Providers [Y/N]:  Prior or Outpatient Records Reviewed [Y/N]:

## 2022-02-26 NOTE — CONSULT NOTE ADULT - SUBJECTIVE AND OBJECTIVE BOX
DATE OF SERVICE: 02-26-22 @ 22:54    CHIEF COMPLAINT:Patient is a 61y old  Female who presents with a chief complaint of Nausea, vomiting, diarrhea. (26 Feb 2022 11:06)      HISTORY OF PRESENT ILLNESS:HPI:  61F with h/o pancolitis, seen in 11/2021 who presented with one week of nonbloody watery diarrhea, vomiting. Said that her symptoms got worst after eating beef tacos and became nauseus. Her diarrhea was nonbloody. Also had stabbing abdominal pain that was 6/10 in intensity. Was taking advil with no relief. Had intermittent fevers. Had chills, weakness, and fatigue. Was switched from budesonide to mesalamine ~ 1 week ago, Rx by GI Dr. Bo Hernandez.  No recent abx, no hx of C diff colitis.  Drinking pedialyte at home.     Back in 11/2021, presented for similar symptoms but endorses feeling worse.     ED course: Found to be tachycardic to 110 and febrile to 100.4. Given one dose of cipro and flagyl with 1.5L LR. Tylenol x1.    (24 Feb 2022 07:24)      PAST MEDICAL & SURGICAL HISTORY:  Hypothyroidism    Asthma    Other depression    GERD (gastroesophageal reflux disease)    H/O tubal ligation            MEDICATIONS:  enoxaparin Injectable 40 milliGRAM(s) SubCutaneous daily      ALBUTerol    90 MICROgram(s) HFA Inhaler 2 Puff(s) Inhalation every 6 hours PRN  budesonide 160 MICROgram(s)/formoterol 4.5 MICROgram(s) Inhaler 2 Puff(s) Inhalation two times a day  guaifenesin/dextromethorphan Oral Liquid 10 milliLiter(s) Oral every 6 hours PRN    acetaminophen     Tablet .. 650 milliGRAM(s) Oral every 6 hours PRN  escitalopram 20 milliGRAM(s) Oral daily  ondansetron   Disintegrating Tablet 4 milliGRAM(s) Oral every 8 hours PRN    pantoprazole    Tablet 40 milliGRAM(s) Oral before breakfast    levothyroxine 125 MICROGram(s) Oral daily  methylPREDNISolone sodium succinate Injectable 20 milliGRAM(s) IV Push every 8 hours        FAMILY HISTORY:  FHx: non-Hodgkin&#x27;s lymphoma (Mother)    Family history of type 2 diabetes mellitus in mother (Mother, Father)        Non-contributory    SOCIAL HISTORY:    [ ] Tobacco  [ ] Drugs  [ ] Alcohol    Allergies    No Known Allergies    Intolerances    	    REVIEW OF SYSTEMS:  CONSTITUTIONAL: No fever  EYES: No eye pain, visual disturbances, or discharge  ENMT:  No difficulty hearing, tinnitus  NECK: No pain or stiffness  RESPIRATORY: No cough, wheezing,  CARDIOVASCULAR: No chest pain, palpitations, passing out, dizziness, or leg swelling  GASTROINTESTINAL:  No nausea, vomiting, diarrhea or constipation. No melena.  GENITOURINARY: No dysuria, hematuria  NEUROLOGICAL: No stroke like symptoms  SKIN: No burning or lesions   ENDOCRINE: No heat or cold intolerance  MUSCULOSKELETAL: No joint pain or swelling  PSYCHIATRIC: No  anxiety, mood swings  HEME/LYMPH: No bleeding gums  ALLERGY AND IMMUNOLOGIC: No hives or eczema	    All other ROS negative    PHYSICAL EXAM:  T(C): 37.3 (02-26-22 @ 21:12), Max: 37.3 (02-26-22 @ 21:12)  HR: 70 (02-26-22 @ 21:12) (70 - 78)  BP: 123/66 (02-26-22 @ 21:12) (90/54 - 123/66)  RR: 18 (02-26-22 @ 21:12) (18 - 18)  SpO2: 98% (02-26-22 @ 21:12) (95% - 98%)  Wt(kg): --  I&O's Summary      Appearance: Normal	  HEENT:   Normal oral mucosa, EOMI	  Cardiovascular:  S1 S2, No JVD,    Respiratory: Lungs clear to auscultation	  Psychiatry: Alert  Gastrointestinal:  Soft, Non-tender, + BS	  Skin: No rashes   Neurologic: Non-focal  Extremities:  No edema  Vascular: Peripheral pulses palpable    	    	  	  CARDIAC MARKERS:  Labs personally reviewed by me                                  10.2   5.97  )-----------( 361      ( 26 Feb 2022 06:50 )             32.2     02-26    141  |  107  |  6<L>  ----------------------------<  117<H>  3.6   |  22  |  0.85    Ca    9.5      26 Feb 2022 06:49  Phos  3.3     02-26  Mg     1.6     02-26    TPro  6.3  /  Alb  3.5  /  TBili  0.1<L>  /  DBili  x   /  AST  11  /  ALT  11  /  AlkPhos  75  02-26          EKG: Personally reviewed by me -   Radiology: Personally reviewed by me -       Assessment /Plan:   Preop Colon risk stratification -- Low risk for low risk procedure, no contraindication to proceed  Full recs to follow in AM            Differential diagnosis and plan of care discussed with patient after the evaluation. Counseling on diet, nutritional counseling, weight management, exercise and medication compliance was done.   Advanced care planning/advanced directives discussed with patient/family. DNR status including forceful chest compressions to attempt to restart the heart, ventilator support/artificial breathing, electric shock, artificial nutrition, health care proxy, Molst form all discussed with pt. Pt wishes to consider. More than fifteen minutes spent on discussing advanced directives.          Melecio Fisher DO [facility]  Cardiovascular Medicine  [address] [place] [address], [address]  Office [phone]  Cell [phone] DATE OF SERVICE: 02-26-22 @ 22:54    CHIEF COMPLAINT:Patient is a 61y old  Female who presents with a chief complaint of Nausea, vomiting, diarrhea. (26 Feb 2022 11:06)      HISTORY OF PRESENT ILLNESS:HPI:  61F with h/o pancolitis, seen in 11/2021 who presented with one week of nonbloody watery diarrhea, vomiting. Said that her symptoms got worst after eating beef tacos and became nauseus. Her diarrhea was nonbloody. Also had stabbing abdominal pain that was 6/10 in intensity. Was taking advil with no relief. Had intermittent fevers. Had chills, weakness, and fatigue. Was switched from budesonide to mesalamine ~ 1 week ago, Rx by GI Dr. Bo Hernandez.  No recent abx, no hx of C diff colitis.  Drinking pedialyte at home.     Back in 11/2021, presented for similar symptoms but endorses feeling worse.     ED course: Found to be tachycardic to 110 and febrile to 100.4. Given one dose of cipro and flagyl with 1.5L LR. Tylenol x1.    (24 Feb 2022 07:24)      PAST MEDICAL & SURGICAL HISTORY:  Hypothyroidism    Asthma    Other depression    GERD (gastroesophageal reflux disease)    H/O tubal ligation            MEDICATIONS:  enoxaparin Injectable 40 milliGRAM(s) SubCutaneous daily      ALBUTerol    90 MICROgram(s) HFA Inhaler 2 Puff(s) Inhalation every 6 hours PRN  budesonide 160 MICROgram(s)/formoterol 4.5 MICROgram(s) Inhaler 2 Puff(s) Inhalation two times a day  guaifenesin/dextromethorphan Oral Liquid 10 milliLiter(s) Oral every 6 hours PRN    acetaminophen     Tablet .. 650 milliGRAM(s) Oral every 6 hours PRN  escitalopram 20 milliGRAM(s) Oral daily  ondansetron   Disintegrating Tablet 4 milliGRAM(s) Oral every 8 hours PRN    pantoprazole    Tablet 40 milliGRAM(s) Oral before breakfast    levothyroxine 125 MICROGram(s) Oral daily  methylPREDNISolone sodium succinate Injectable 20 milliGRAM(s) IV Push every 8 hours        FAMILY HISTORY:  FHx: non-Hodgkin&#x27;s lymphoma (Mother)    Family history of type 2 diabetes mellitus in mother (Mother, Father)        Non-contributory    SOCIAL HISTORY:    [ ] Tobacco  [ ] Drugs  [ ] Alcohol    Allergies    No Known Allergies    Intolerances    	    REVIEW OF SYSTEMS:  CONSTITUTIONAL: No fever  EYES: No eye pain, visual disturbances, or discharge  ENMT:  No difficulty hearing, tinnitus  NECK: No pain or stiffness  RESPIRATORY: No cough, wheezing,  CARDIOVASCULAR: No chest pain, palpitations, passing out, dizziness, or leg swelling  GASTROINTESTINAL:  No nausea, vomiting, diarrhea or constipation. No melena.  GENITOURINARY: No dysuria, hematuria  NEUROLOGICAL: No stroke like symptoms  SKIN: No burning or lesions   ENDOCRINE: No heat or cold intolerance  MUSCULOSKELETAL: No joint pain or swelling  PSYCHIATRIC: No  anxiety, mood swings  HEME/LYMPH: No bleeding gums  ALLERGY AND IMMUNOLOGIC: No hives or eczema	    All other ROS negative    PHYSICAL EXAM:  T(C): 37.3 (02-26-22 @ 21:12), Max: 37.3 (02-26-22 @ 21:12)  HR: 70 (02-26-22 @ 21:12) (70 - 78)  BP: 123/66 (02-26-22 @ 21:12) (90/54 - 123/66)  RR: 18 (02-26-22 @ 21:12) (18 - 18)  SpO2: 98% (02-26-22 @ 21:12) (95% - 98%)  Wt(kg): --  I&O's Summary      Appearance: Normal	  HEENT:   Normal oral mucosa, EOMI	  Cardiovascular:  S1 S2, No JVD,    Respiratory: Lungs clear to auscultation	  Psychiatry: Alert  Gastrointestinal:  Soft, Non-tender, + BS	  Skin: No rashes   Neurologic: Non-focal  Extremities:  No edema  Vascular: Peripheral pulses palpable    	    	  	  CARDIAC MARKERS:  Labs personally reviewed by me                                  10.2   5.97  )-----------( 361      ( 26 Feb 2022 06:50 )             32.2     02-26    141  |  107  |  6<L>  ----------------------------<  117<H>  3.6   |  22  |  0.85    Ca    9.5      26 Feb 2022 06:49  Phos  3.3     02-26  Mg     1.6     02-26    TPro  6.3  /  Alb  3.5  /  TBili  0.1<L>  /  DBili  x   /  AST  11  /  ALT  11  /  AlkPhos  75  02-26          EKG: Personally reviewed by me - NSR  Radiology: Personally reviewed by me -   < from: Xray Chest 1 View- PORTABLE-Routine (Xray Chest 1 View- PORTABLE-Routine .) (02.24.22 @ 13:19) >  IMPRESSION:  Right aortic arch. No active pulmonary disease.    < end of copied text >      Assessment /Plan:   1. risk stratification  -Preop Colon risk stratification -- Low risk for low risk procedure, no contraindication to proceed  - denies cardiac history, no high risk features    2. Pancolitis flare  -hx of ulcerative colitis  - CT abdomen: Mild diffuse colonic wall thickening, most pronounced involving the ascending and transverse colon  - steroids for flare    3. hypothyroid  -continue synthroid    4. DVT proph  -lovenox SQ    5. Right Aortic arch -- Asymptomatic, outpt echo            Differential diagnosis and plan of care discussed with patient after the evaluation. Counseling on diet, nutritional counseling, weight management, exercise and medication compliance was done.   Advanced care planning/advanced directives discussed with patient/family. DNR status including forceful chest compressions to attempt to restart the heart, ventilator support/artificial breathing, electric shock, artificial nutrition, health care proxy, Molst form all discussed with pt. Pt wishes to consider. More than fifteen minutes spent on discussing advanced directives. One hundred ten minutes spent on encounter, of which more than fifty percent of the encounter was spent on counseling and/or coordinating care by the attending physician.        Melecio Fisher DO [facility]  Cardiovascular Medicine  [address] [place] [address], [address]  Office [phone]  Cell [phone]

## 2022-02-26 NOTE — DISCHARGE NOTE PROVIDER - NSDCMRMEDTOKEN_GEN_ALL_CORE_FT
Advair Diskus 250 mcg-50 mcg inhalation powder: 1 puff(s) inhaled 2 times a day  escitalopram 20 mg oral tablet: 1 tab(s) orally once a day  mesalamine 1.2 g oral delayed release tablet: 4 tab(s) orally once a day  omeprazole 40 mg oral delayed release capsule: 1 cap(s) orally once a day  Unithroid 125 mcg (0.125 mg) oral tablet: 1 tab(s) orally once a day   Advair Diskus 250 mcg-50 mcg inhalation powder: 1 puff(s) inhaled 2 times a day  escitalopram 20 mg oral tablet: 1 tab(s) orally once a day  mesalamine 1.2 g oral delayed release tablet: 4 tab(s) orally once a day  omeprazole 40 mg oral delayed release capsule: 1 cap(s) orally once a day  predniSONE 10 mg oral tablet: 4 tab(s) orally once a day for 7days 3/1-3/7  predniSONE 10 mg oral tablet: 3 tab(s) orally once a day for 7 days from 3/8 - 3/14.    Unithroid 125 mcg (0.125 mg) oral tablet: 1 tab(s) orally once a day   Advair Diskus 250 mcg-50 mcg inhalation powder: 1 puff(s) inhaled 2 times a day  escitalopram 20 mg oral tablet: 1 tab(s) orally once a day  mesalamine 1.2 g oral delayed release tablet: 4 tab(s) orally once a day   omeprazole 40 mg oral delayed release capsule: 1 cap(s) orally once a day  predniSONE 10 mg oral tablet: 4 tab(s) orally once a day for 7days 3/1-3/7  predniSONE 10 mg oral tablet: 4 tab(s) orally once a day for 7days 3/1-3/7    predniSONE 10 mg oral tablet: 3 tab(s) orally once a day for 7 days from 3/8 - 3/14  Unithroid 125 mcg (0.125 mg) oral tablet: 1 tab(s) orally once a day   Advair Diskus 250 mcg-50 mcg inhalation powder: 1 puff(s) inhaled 2 times a day  escitalopram 20 mg oral tablet: 1 tab(s) orally once a day  mesalamine 1.2 g oral delayed release tablet: 4 tab(s) orally once a day   omeprazole 40 mg oral delayed release capsule: 1 cap(s) orally once a day  predniSONE 10 mg oral tablet: 4 tab(s) orally once a day for 7days 3/1-3/7    predniSONE 10 mg oral tablet: 3 tab(s) orally once a day for 7 days from 3/8 - 3/14  Unithroid 125 mcg (0.125 mg) oral tablet: 1 tab(s) orally once a day

## 2022-02-26 NOTE — DISCHARGE NOTE PROVIDER - HOSPITAL COURSE
61F with h/o pancolitis, seen in 11/2021 who presented with one week of nonbloody watery diarrhea, vomiting. Said that her symptoms got worst after eating beef tacos and became nauseous. Her diarrhea was nonbloody. Also had stabbing abdominal pain that was 6/10 in intensity. Was taking advil with no relief. Had intermittent fevers. Had chills, weakness, and fatigue. Was switched from budesonide to mesalamine ~ 1 week ago, Rx by GI Dr. Bo Hernandez.  No recent abx, no hx of C diff colitis.  Drinking pedialyte at home.     Back in 11/2021, presented for similar symptoms but endorses feeling worse. According to Dr. Hernandez, the patient had been noncompliant with her mesalamine. Dr. Hernandez has been trying to get the patient switch to a biologic agent like humera but the patient has so far refused.     ED course: Found to be tachycardic to 110 and febrile to 100.4. Given one dose of cipro and flagyl with 1.5L LR. Tylenol x1. 61F with h/o pancolitis, seen in 11/2021 who presented with one week of nonbloody watery diarrhea, vomiting. Said that her symptoms got worst after eating beef tacos and became nauseous. Her diarrhea was nonbloody. Also had stabbing abdominal pain that was 6/10 in intensity. Was taking advil with no relief. Had intermittent fevers. Had chills, weakness, and fatigue. Was switched from budesonide to mesalamine ~ 1 week ago, Rx by GI Dr. Bo Hernandez.  No recent abx, no hx of C diff colitis.  Drinking pedialyte at home.   Back in 11/2021, presented for similar symptoms but endorses feeling worse. According to Dr. Hernandez, the patient had been noncompliant with her mesalamine. Dr. Hernandez has been trying to get the patient switch to a biologic agent like humera but the patient has so far refused.   ED course: Found to be tachycardic to 110 and febrile to 100.4. Given one dose of cipro and flagyl with 1.5L LR. Tylenol x1.     Hospital course:  Pt was initially treated for pancolitis w cipro/flagyl. GI PCR was negative and it was suspected that pancolitis is from UC flare. Pt was started on IV steroids w significant improvement in symptoms. Diarrheal episodes became less frequent and pt was afebrile. Flex sig / colonoscopy were deferred given pt's significant improvement.  Pt was stable and ready to be discharged on PO prednisone taper (starting at 40mg), resume home mesalamine, and f/u w GI Dr. Hernandez

## 2022-02-26 NOTE — PROGRESS NOTE ADULT - ASSESSMENT
1. Pancolitis. Suspect exacerbation of underlying UC. Stools negative for infection.  -will start solumedrol. Can d/c abx.    2. Nausea  -cw prn antiemetics    3. Fevers, resolved  -BC NGTD     4. Hypothyroid  -on levothyroxine   1. Pancolitis. Suspect exacerbation of underlying UC. Stools negative for infection. Inflammatory markers elevated.  -will start solumedrol. Can d/c abx.  -may need flex sig    2. Nausea  -cw prn antiemetics    3. Fevers, resolved  -BC NGTD     4. Hypothyroid  -on levothyroxine        I had a prolonged conversation with the patient regarding the hospital course, differential diagnosis, results of diagnostic tests this far, and therapeutic modalities available. Plan of care discussed with the patient after the evaluation. Patient expresses a clear understanding of the plan of care.  Sixty five minutes spent on the total encounter, of which more than fifty percent of the encounter was spent on counseling and/or coordinating care by the attending physician.        David Preez M.D.   Gastroenterology and Hepatology  [address] [place], [place]  Office: [phone]  Cell: [phone]

## 2022-02-26 NOTE — PROGRESS NOTE ADULT - PROBLEM SELECTOR PLAN 2
2/2 Ulcerative colitis flare  Follows with [facility] Dr. Tromba   - Last colonoscopy one year ago   - CT abdomen: Mild diffuse colonic wall thickening, most pronounced involving the ascending and transverse colon  - hold mesalamine, start systemic steroids for UC flare  - GI PCR not detected

## 2022-02-26 NOTE — PROGRESS NOTE ADULT - PROBLEM SELECTOR PLAN 3
Baseline creatinine: 0.89 Today’s creatinine: 0.91  ANGEL likely due to low PO intake   Trend BUN/Cr Baseline creatinine: 0.89  ANGEL likely due to low PO intake   Trend BUN/Cr

## 2022-02-26 NOTE — DISCHARGE NOTE PROVIDER - PROVIDER TOKENS
PROVIDER:[TOKEN:[[id number]],FOLLOWUP:[2 weeks],ESTABLISHEDPATIENT:[T]] PROVIDER:[TOKEN:[[id number]],FOLLOWUP:[2 weeks],ESTABLISHEDPATIENT:[T]],PROVIDER:[TOKEN:[[id number]],FOLLOWUP:[1 week],ESTABLISHEDPATIENT:[T]]

## 2022-02-26 NOTE — PROGRESS NOTE ADULT - PROBLEM SELECTOR PLAN 1
Meets 2/4 SIRS criteria (Temp, HR) - low suspicion for infection   - s/p 1.5L LR in   - s/p cipro/metro  - U/A negative   - blood cx NGTD  - Per GI hold abx, start systemic steroids for UC flare

## 2022-02-26 NOTE — PROGRESS NOTE ADULT - ASSESSMENT
61F with h/o pancolitis presents with one week of abdominal discomfort and NBNB diarrhea likely 2/2 UC flare, now on systemic steroids.

## 2022-02-26 NOTE — DISCHARGE NOTE PROVIDER - NSDCCPCAREPLAN_GEN_ALL_CORE_FT
PRINCIPAL DISCHARGE DIAGNOSIS  Diagnosis: Pancolitis  Assessment and Plan of Treatment: You were found to have pancolitis on CT scan. This is similar to the diagnosis you had previously as seen through a colonoscopy. This pancolitis comes in the setting of the ulcerative colitis that you have. Your GI doctor Dr. Hernandez was consulted for your care and you were also seen by the GI service in the hospital. They advised to continue your antibiotics and continue your mesalamine during admission. We also tested you for TB in order to prepare you for a possible switch to humera upon discharge.       PRINCIPAL DISCHARGE DIAGNOSIS  Diagnosis: Pancolitis  Assessment and Plan of Treatment: You were found to have pancolitis on CT scan. This is similar to the diagnosis you had previously as seen through a colonoscopy. This pancolitis comes in the setting of the ulcerative colitis that you have. Your GI doctor Dr. Hernandez was consulted for your care and you were also seen by the GI service in the hospital. You were treated with antibiotics and prednisone for your UC flare.   Please continue your mesalamine after discharge  Please continue to take prednisone (steroid) after discharge. Take 40mg for 7 days, then 30mg for days. Follow up with Dr. Hernandez within 1 week for instructions on how to further wean your prednisone dose. Do no abruptly stop or change the dose without consulting your doctor.   Please return to the ED if you have worsening diarrhea, bloody diarrhea, fevers.

## 2022-02-26 NOTE — PROGRESS NOTE ADULT - SUBJECTIVE AND OBJECTIVE BOX
Chief Complaint:  Patient is a 61y old  Female who presents with a chief complaint of Nausea, vomiting, diarrhea. (26 Feb 2022 05:32)      Date of service 02-26-22 @ 08:13      Interval Events:   feels improved  still w diarrhea    Hospital Medications:  acetaminophen     Tablet .. 650 milliGRAM(s) Oral every 6 hours PRN  ALBUTerol    90 MICROgram(s) HFA Inhaler 2 Puff(s) Inhalation every 6 hours PRN  budesonide 160 MICROgram(s)/formoterol 4.5 MICROgram(s) Inhaler 2 Puff(s) Inhalation two times a day  ciprofloxacin   IVPB 400 milliGRAM(s) IV Intermittent every 12 hours  enoxaparin Injectable 40 milliGRAM(s) SubCutaneous daily  escitalopram 20 milliGRAM(s) Oral daily  guaifenesin/dextromethorphan Oral Liquid 10 milliLiter(s) Oral every 6 hours PRN  levothyroxine 125 MICROGram(s) Oral daily  metroNIDAZOLE  IVPB 500 milliGRAM(s) IV Intermittent every 8 hours  ondansetron   Disintegrating Tablet 4 milliGRAM(s) Oral every 8 hours PRN  pantoprazole    Tablet 40 milliGRAM(s) Oral before breakfast        Review of Systems:  General:  No wt loss, fevers, chills, night sweats, fatigue,   Eyes:  Good vision, no reported pain  ENT:  No sore throat, pain, runny nose, dysphagia  CV:  No pain, palpitations, hypo/hypertension  Resp:  No dyspnea, cough, tachypnea, wheezing  GI:  See HPI  :  No pain, bleeding, incontinence, nocturia  Muscle:  No pain, weakness  Neuro:  No weakness, tingling, memory problems  Psych:  No fatigue, insomnia, mood problems, depression  Endocrine:  No polyuria, polydipsia, cold/heat intolerance  Heme:  No petechiae, ecchymosis, easy bruisability  Integumentary:  No rash, edema    PHYSICAL EXAM:   Vital Signs:  Vital Signs Last 24 Hrs  T(C): 36.8 (26 Feb 2022 04:20), Max: 37.1 (25 Feb 2022 17:02)  T(F): 98.3 (26 Feb 2022 04:20), Max: 98.8 (25 Feb 2022 21:27)  HR: 78 (26 Feb 2022 04:20) (56 - 88)  BP: 99/59 (26 Feb 2022 04:20) (99/59 - 127/80)  BP(mean): --  RR: 18 (26 Feb 2022 04:20) (18 - 18)  SpO2: 95% (26 Feb 2022 04:20) (95% - 99%)  Daily     Daily       PHYSICAL EXAM:     GENERAL:  Appears stated age, well-groomed, well-nourished, no distress  HEENT:  NC/AT,  conjunctivae anicteric, clear and pink,   NECK: supple, trachea midline  CHEST:  Full & symmetric excursion, no increased effort, breath sounds clear  HEART:  Regular rhythm, no JVD  ABDOMEN:  Soft, non-tender, non-distended, normoactive bowel sounds,  no masses , no hepatosplenomegaly  EXTREMITIES:  no cyanosis,clubbing or edema  SKIN:  No rash, erythema, or, ecchymoses, no jaundice  NEURO:  Alert, non-focal, no asterixis  PSYCH: Appropriate affect, oriented to place and time  RECTAL: Deferred      LABS Personally reviewed by me:                        10.2   5.97  )-----------( 361      ( 26 Feb 2022 06:50 )             32.2     Mean Cell Volume: 78.7 fl (02-26-22 @ 06:50)    02-26    141  |  107  |  6<L>  ----------------------------<  117<H>  3.6   |  22  |  0.85    Ca    9.5      26 Feb 2022 06:49  Phos  3.3     02-26  Mg     1.6     02-26    TPro  6.3  /  Alb  3.5  /  TBili  0.1<L>  /  DBili  x   /  AST  11  /  ALT  11  /  AlkPhos  75  02-26    LIVER FUNCTIONS - ( 26 Feb 2022 06:49 )  Alb: 3.5 g/dL / Pro: 6.3 g/dL / ALK PHOS: 75 U/L / ALT: 11 U/L / AST: 11 U/L / GGT: x                                       10.2   5.97  )-----------( 361      ( 26 Feb 2022 06:50 )             32.2                         9.9    5.08  )-----------( 307      ( 25 Feb 2022 07:05 )             31.3                         10.4   6.03  )-----------( 315      ( 24 Feb 2022 09:03 )             33.2                         11.6   9.92  )-----------( 348      ( 24 Feb 2022 00:12 )             36.5       Imaging personally reviewed by me:

## 2022-02-26 NOTE — DISCHARGE NOTE PROVIDER - CARE PROVIDER_API CALL
Bo Hernandez  [address], [address]  [place] [address]  Phone: [phone]  Fax: [phone]  Established Patient  Follow Up Time: 2 weeks   Bo Hernandez  MEDICINE  [address] [facility], [address]  [place] [address]  Phone: [phone]  Fax: [phone]  Established Patient  Follow Up Time: 2 weeks    YOKO VALVERDE  Internal Medicine  [address], [address]  [place] [address]  Phone: [phone]  Fax: [phone]  Established Patient  Follow Up Time: 1 week

## 2022-02-26 NOTE — PROVIDER CONTACT NOTE (MEDICATION) - ACTION/TREATMENT ORDERED:
MD made aware, seen and examined pt, escalated issue to manager and nurse instructor,  Benadryl 25 mg PO given, Cipro IVSS restarted on a new IVL as per MD, will closely monitor patient.

## 2022-02-27 LAB
ALBUMIN SERPL ELPH-MCNC: 3.8 G/DL — SIGNIFICANT CHANGE UP (ref 3.3–5)
ALP SERPL-CCNC: 76 U/L — SIGNIFICANT CHANGE UP (ref 40–120)
ALT FLD-CCNC: 12 U/L — SIGNIFICANT CHANGE UP (ref 10–45)
ANION GAP SERPL CALC-SCNC: 14 MMOL/L — SIGNIFICANT CHANGE UP (ref 5–17)
AST SERPL-CCNC: 8 U/L — LOW (ref 10–40)
BILIRUB SERPL-MCNC: 0.1 MG/DL — LOW (ref 0.2–1.2)
BUN SERPL-MCNC: 9 MG/DL — SIGNIFICANT CHANGE UP (ref 7–23)
CALCIUM SERPL-MCNC: 9.9 MG/DL — SIGNIFICANT CHANGE UP (ref 8.4–10.5)
CHLORIDE SERPL-SCNC: 105 MMOL/L — SIGNIFICANT CHANGE UP (ref 96–108)
CO2 SERPL-SCNC: 22 MMOL/L — SIGNIFICANT CHANGE UP (ref 22–31)
CREAT SERPL-MCNC: 0.73 MG/DL — SIGNIFICANT CHANGE UP (ref 0.5–1.3)
CULTURE RESULTS: SIGNIFICANT CHANGE UP
GLUCOSE SERPL-MCNC: 180 MG/DL — HIGH (ref 70–99)
HCT VFR BLD CALC: 33.9 % — LOW (ref 34.5–45)
HGB BLD-MCNC: 10.9 G/DL — LOW (ref 11.5–15.5)
MAGNESIUM SERPL-MCNC: 1.7 MG/DL — SIGNIFICANT CHANGE UP (ref 1.6–2.6)
MCHC RBC-ENTMCNC: 25.2 PG — LOW (ref 27–34)
MCHC RBC-ENTMCNC: 32.2 GM/DL — SIGNIFICANT CHANGE UP (ref 32–36)
MCV RBC AUTO: 78.3 FL — LOW (ref 80–100)
NRBC # BLD: 0 /100 WBCS — SIGNIFICANT CHANGE UP (ref 0–0)
PHOSPHATE SERPL-MCNC: 2.7 MG/DL — SIGNIFICANT CHANGE UP (ref 2.5–4.5)
PLATELET # BLD AUTO: 417 K/UL — HIGH (ref 150–400)
POTASSIUM SERPL-MCNC: 3.9 MMOL/L — SIGNIFICANT CHANGE UP (ref 3.5–5.3)
POTASSIUM SERPL-SCNC: 3.9 MMOL/L — SIGNIFICANT CHANGE UP (ref 3.5–5.3)
PROT SERPL-MCNC: 6.8 G/DL — SIGNIFICANT CHANGE UP (ref 6–8.3)
RBC # BLD: 4.33 M/UL — SIGNIFICANT CHANGE UP (ref 3.8–5.2)
RBC # FLD: 14 % — SIGNIFICANT CHANGE UP (ref 10.3–14.5)
SODIUM SERPL-SCNC: 141 MMOL/L — SIGNIFICANT CHANGE UP (ref 135–145)
SPECIMEN SOURCE: SIGNIFICANT CHANGE UP
WBC # BLD: 7.31 K/UL — SIGNIFICANT CHANGE UP (ref 3.8–10.5)
WBC # FLD AUTO: 7.31 K/UL — SIGNIFICANT CHANGE UP (ref 3.8–10.5)

## 2022-02-27 PROCEDURE — 99232 SBSQ HOSP IP/OBS MODERATE 35: CPT | Mod: GC

## 2022-02-27 RX ADMIN — Medication 20 MILLIGRAM(S): at 21:19

## 2022-02-27 RX ADMIN — Medication 125 MICROGRAM(S): at 05:35

## 2022-02-27 RX ADMIN — ESCITALOPRAM OXALATE 20 MILLIGRAM(S): 10 TABLET, FILM COATED ORAL at 12:12

## 2022-02-27 RX ADMIN — Medication 20 MILLIGRAM(S): at 13:29

## 2022-02-27 RX ADMIN — BUDESONIDE AND FORMOTEROL FUMARATE DIHYDRATE 2 PUFF(S): 160; 4.5 AEROSOL RESPIRATORY (INHALATION) at 17:49

## 2022-02-27 RX ADMIN — Medication 20 MILLIGRAM(S): at 05:35

## 2022-02-27 RX ADMIN — PANTOPRAZOLE SODIUM 40 MILLIGRAM(S): 20 TABLET, DELAYED RELEASE ORAL at 05:35

## 2022-02-27 NOTE — PROGRESS NOTE ADULT - ASSESSMENT
61F with h/o pancolitis presents with one week of abdominal discomfort and NBNB diarrhea likely 2/2 UC flare, now on systemic steroids. 61F with h/o pancolitis presents with one week of abdominal discomfort and NBNB diarrhea likely 2/2 UC flare, now on systemic steroids. Will undergo flex sig.

## 2022-02-27 NOTE — PROGRESS NOTE ADULT - PROBLEM SELECTOR PLAN 3
Baseline creatinine: 0.89  ANGEL likely due to low PO intake   Trend BUN/Cr Meets 2/4 SIRS criteria (Temp, HR) - low suspicion for infection   - s/p 1.5L LR in   - s/p cipro/metro  - U/A negative   - blood cx NGTD  - Per GI hold abx, start systemic steroids for UC flare

## 2022-02-27 NOTE — PROGRESS NOTE ADULT - ASSESSMENT
1. UC flare. Seems much improved today.  -will hold off on colonoscopy for now, if continues to improve, can change to PO prednisone tomorrow and dispo planning    2. Nausea  -cw prn antiemetics    3. Fevers, resolved  -BC NGTD     4. Hypothyroid  -on levothyroxine        I had a prolonged conversation with the patient regarding the hospital course, differential diagnosis, results of diagnostic tests this far, and therapeutic modalities available. Plan of care discussed with the patient after the evaluation. Patient expresses a clear understanding of the plan of care.  Sixty five minutes spent on the total encounter, of which more than fifty percent of the encounter was spent on counseling and/or coordinating care by the attending physician.        David Perez M.D.   Gastroenterology and Hepatology  [address] [place], [place]  Office: [phone]  Cell: [phone]

## 2022-02-27 NOTE — PROGRESS NOTE ADULT - SUBJECTIVE AND OBJECTIVE BOX
Chief Complaint:  Patient is a 61y old  Female who presents with a chief complaint of Nausea, vomiting, diarrhea. (27 Feb 2022 12:36)      Date of service 02-27-22 @ 19:30      Interval Events: abd pain much improved    Hospital Medications:  acetaminophen     Tablet .. 650 milliGRAM(s) Oral every 6 hours PRN  ALBUTerol    90 MICROgram(s) HFA Inhaler 2 Puff(s) Inhalation every 6 hours PRN  budesonide 160 MICROgram(s)/formoterol 4.5 MICROgram(s) Inhaler 2 Puff(s) Inhalation two times a day  enoxaparin Injectable 40 milliGRAM(s) SubCutaneous daily  escitalopram 20 milliGRAM(s) Oral daily  guaifenesin/dextromethorphan Oral Liquid 10 milliLiter(s) Oral every 6 hours PRN  levothyroxine 125 MICROGram(s) Oral daily  methylPREDNISolone sodium succinate Injectable 20 milliGRAM(s) IV Push every 8 hours  ondansetron   Disintegrating Tablet 4 milliGRAM(s) Oral every 8 hours PRN  pantoprazole    Tablet 40 milliGRAM(s) Oral before breakfast        Review of Systems:  General:  No wt loss, fevers, chills, night sweats, fatigue,   Eyes:  Good vision, no reported pain  ENT:  No sore throat, pain, runny nose, dysphagia  CV:  No pain, palpitations, hypo/hypertension  Resp:  No dyspnea, cough, tachypnea, wheezing  GI:  See HPI  :  No pain, bleeding, incontinence, nocturia  Muscle:  No pain, weakness  Neuro:  No weakness, tingling, memory problems  Psych:  No fatigue, insomnia, mood problems, depression  Endocrine:  No polyuria, polydipsia, cold/heat intolerance  Heme:  No petechiae, ecchymosis, easy bruisability  Integumentary:  No rash, edema    PHYSICAL EXAM:   Vital Signs:  Vital Signs Last 24 Hrs  T(C): 36.9 (27 Feb 2022 14:26), Max: 37.3 (26 Feb 2022 21:12)  T(F): 98.5 (27 Feb 2022 14:26), Max: 99.1 (26 Feb 2022 21:12)  HR: 74 (27 Feb 2022 14:26) (70 - 74)  BP: 125/72 (27 Feb 2022 14:26) (116/68 - 125/72)  BP(mean): --  RR: 18 (27 Feb 2022 14:26) (18 - 18)  SpO2: 98% (27 Feb 2022 14:26) (97% - 98%)  Daily     Daily       PHYSICAL EXAM:     GENERAL:  Appears stated age, well-groomed, well-nourished, no distress  HEENT:  NC/AT,  conjunctivae anicteric, clear and pink,   NECK: supple, trachea midline  CHEST:  Full & symmetric excursion, no increased effort, breath sounds clear  HEART:  Regular rhythm, no JVD  ABDOMEN:  Soft, non-tender, non-distended, normoactive bowel sounds,  no masses , no hepatosplenomegaly  EXTREMITIES:  no cyanosis,clubbing or edema  SKIN:  No rash, erythema, or, ecchymoses, no jaundice  NEURO:  Alert, non-focal, no asterixis  PSYCH: Appropriate affect, oriented to place and time  RECTAL: Deferred      LABS Personally reviewed by me:                        10.9   7.31  )-----------( 417      ( 27 Feb 2022 07:29 )             33.9     Mean Cell Volume: 78.3 fl (02-27-22 @ 07:29)    02-27    141  |  105  |  9   ----------------------------<  180<H>  3.9   |  22  |  0.73    Ca    9.9      27 Feb 2022 07:28  Phos  2.7     02-27  Mg     1.7     02-27    TPro  6.8  /  Alb  3.8  /  TBili  0.1<L>  /  DBili  x   /  AST  8<L>  /  ALT  12  /  AlkPhos  76  02-27    LIVER FUNCTIONS - ( 27 Feb 2022 07:28 )  Alb: 3.8 g/dL / Pro: 6.8 g/dL / ALK PHOS: 76 U/L / ALT: 12 U/L / AST: 8 U/L / GGT: x                                       10.9   7.31  )-----------( 417      ( 27 Feb 2022 07:29 )             33.9                         10.2   5.97  )-----------( 361      ( 26 Feb 2022 06:50 )             32.2                         9.9    5.08  )-----------( 307      ( 25 Feb 2022 07:05 )             31.3       Imaging personally reviewed by me:

## 2022-02-27 NOTE — PROGRESS NOTE ADULT - SUBJECTIVE AND OBJECTIVE BOX
Date of Service:  02-27-22 @ 12:36    Patient is a 61y old  Female who presents with a chief complaint of Nausea, vomiting, diarrhea. (27 Feb 2022 00:16)      INTERVAL HISTORY: feels well       REVIEW OF SYSTEMS:   CONSTITUTIONAL: No weakness  EYES/ENT: No visual changes; No throat pain  Neck: No pain or stiffness  Respiratory: No cough, wheezing, No shortness of breath  CARDIOVASCULAR: no chest pain or palpitations  GASTROINTESTINAL: No abdominal pain, no nausea, vomiting or hematemesis  GENITOURINARY: No dysuria, frequency or hematuria  NEUROLOGICAL: No stroke like symptoms  SKIN: No rashes    	  MEDICATIONS:        PHYSICAL EXAM:  T(C): 36.8 (02-27-22 @ 04:59), Max: 37.3 (02-26-22 @ 21:12)  HR: 73 (02-27-22 @ 04:59) (70 - 73)  BP: 116/68 (02-27-22 @ 04:59) (90/54 - 123/66)  RR: 18 (02-27-22 @ 04:59) (18 - 18)  SpO2: 97% (02-27-22 @ 04:59) (95% - 98%)  Wt(kg): --  I&O's Summary        Appearance: In no distress	  HEENT:    PERRL, EOMI	  Cardiovascular:  S1 S2, No JVD  Respiratory: Lungs clear to auscultation	  Gastrointestinal:  Soft, Non-tender, + BS	  Vascularature:  +1 BLLE edema   Psychiatric: Appropriate affect   Neuro: no acute focal deficits                               10.9   7.31  )-----------( 417      ( 27 Feb 2022 07:29 )             33.9     02-27    141  |  105  |  9   ----------------------------<  180<H>  3.9   |  22  |  0.73    Ca    9.9      27 Feb 2022 07:28  Phos  2.7     02-27  Mg     1.7     02-27    TPro  6.8  /  Alb  3.8  /  TBili  0.1<L>  /  DBili  x   /  AST  8<L>  /  ALT  12  /  AlkPhos  76  02-27        Labs personally reviewed      ASSESSMENT/PLAN: 	    1. risk stratification  -Preop Colon risk stratification -- Low risk for low risk procedure, no contraindication to proceed  Full recs to follow in AM    2. Pancolitis flare  -hx of ulcerative colitis  - CT abdomen: Mild diffuse colonic wall thickening, most pronounced involving the ascending and transverse colon  - steroids for flare    3. hypothyroid  -continue synthroid    4. DVT proph  -lovenox SQ      Heidi Smith MSN, FNP-BC, AGACNP-BC, JIMY  Melecio Fisher DO [facility]  Cardiovascular Medicine  [address] [place] [address], [address]  Office: [phone]  Cell: [phone] Date of Service:  02-27-22 @ 12:36    Patient is a 61y old  Female who presents with a chief complaint of Nausea, vomiting, diarrhea. (27 Feb 2022 00:16)      INTERVAL HISTORY: feels well       REVIEW OF SYSTEMS:   CONSTITUTIONAL: No weakness  EYES/ENT: No visual changes; No throat pain  Neck: No pain or stiffness  Respiratory: No cough, wheezing, No shortness of breath  CARDIOVASCULAR: no chest pain or palpitations  GASTROINTESTINAL: No abdominal pain, no nausea, vomiting or hematemesis  GENITOURINARY: No dysuria, frequency or hematuria  NEUROLOGICAL: No stroke like symptoms  SKIN: No rashes    	  MEDICATIONS:        PHYSICAL EXAM:  T(C): 36.8 (02-27-22 @ 04:59), Max: 37.3 (02-26-22 @ 21:12)  HR: 73 (02-27-22 @ 04:59) (70 - 73)  BP: 116/68 (02-27-22 @ 04:59) (90/54 - 123/66)  RR: 18 (02-27-22 @ 04:59) (18 - 18)  SpO2: 97% (02-27-22 @ 04:59) (95% - 98%)  Wt(kg): --  I&O's Summary        Appearance: In no distress	  HEENT:    PERRL, EOMI	  Cardiovascular:  S1 S2, No JVD  Respiratory: right base exp wheezing, L base diminished  Gastrointestinal:  Soft, Non-tender, + BS	  Vascularature:  +1 BLLE edema   Psychiatric: Appropriate affect   Neuro: no acute focal deficits                               10.9   7.31  )-----------( 417      ( 27 Feb 2022 07:29 )             33.9     02-27    141  |  105  |  9   ----------------------------<  180<H>  3.9   |  22  |  0.73    Ca    9.9      27 Feb 2022 07:28  Phos  2.7     02-27  Mg     1.7     02-27    TPro  6.8  /  Alb  3.8  /  TBili  0.1<L>  /  DBili  x   /  AST  8<L>  /  ALT  12  /  AlkPhos  76  02-27        Labs personally reviewed      ASSESSMENT/PLAN: 	    1. risk stratification  -Preop Colon risk stratification -- Low risk for low risk procedure, no contraindication to proceed  Full recs to follow in AM    2. Pancolitis flare  -hx of ulcerative colitis  - CT abdomen: Mild diffuse colonic wall thickening, most pronounced involving the ascending and transverse colon  - steroids for flare    3. hypothyroid  -continue synthroid    4. DVT proph  -lovenox SQ      Heidi Smith MSN, FNP-BC, AGACNP-BC, JIMY  Melecio Fisher DO [facility]  Cardiovascular Medicine  [address] [place] [address], [address]  Office: [phone]  Cell: [phone] Date of Service:  02-27-22 @ 12:36    Patient is a 61y old  Female who presents with a chief complaint of Nausea, vomiting, diarrhea. (27 Feb 2022 00:16)      INTERVAL HISTORY: feels well       REVIEW OF SYSTEMS:   CONSTITUTIONAL: No weakness  EYES/ENT: No visual changes; No throat pain  Neck: No pain or stiffness  Respiratory: No cough, wheezing, No shortness of breath  CARDIOVASCULAR: no chest pain or palpitations  GASTROINTESTINAL: No abdominal pain, no nausea, vomiting or hematemesis  GENITOURINARY: No dysuria, frequency or hematuria  NEUROLOGICAL: No stroke like symptoms  SKIN: No rashes    	  MEDICATIONS:        PHYSICAL EXAM:  T(C): 36.8 (02-27-22 @ 04:59), Max: 37.3 (02-26-22 @ 21:12)  HR: 73 (02-27-22 @ 04:59) (70 - 73)  BP: 116/68 (02-27-22 @ 04:59) (90/54 - 123/66)  RR: 18 (02-27-22 @ 04:59) (18 - 18)  SpO2: 97% (02-27-22 @ 04:59) (95% - 98%)  Wt(kg): --  I&O's Summary        Appearance: In no distress	  HEENT:    PERRL, EOMI	  Cardiovascular:  S1 S2, No JVD  Respiratory: right base exp wheezing, L base diminished  Gastrointestinal:  Soft, Non-tender, + BS	  Vascularature:  +1 BLLE edema   Psychiatric: Appropriate affect   Neuro: no acute focal deficits                               10.9   7.31  )-----------( 417      ( 27 Feb 2022 07:29 )             33.9     02-27    141  |  105  |  9   ----------------------------<  180<H>  3.9   |  22  |  0.73    Ca    9.9      27 Feb 2022 07:28  Phos  2.7     02-27  Mg     1.7     02-27    TPro  6.8  /  Alb  3.8  /  TBili  0.1<L>  /  DBili  x   /  AST  8<L>  /  ALT  12  /  AlkPhos  76  02-27        Labs personally reviewed      ASSESSMENT/PLAN: 	    1. risk stratification  -Preop Colon risk stratification -- Low risk for low risk procedure, no contraindication to proceed  - denies cardiac history, no high risk features    2. Pancolitis flare  -hx of ulcerative colitis  - CT abdomen: Mild diffuse colonic wall thickening, most pronounced involving the ascending and transverse colon  - steroids for flare    3. hypothyroid  -continue synthroid    4. DVT proph  -lovenox SQ      Heidi Smith MSN, FNP-BC, AGACNP-BC, JIMY  Melecio Fisher DO [facility]  Cardiovascular Medicine  [address] [place] [address], [address]  Office: [phone]  Cell: [phone] Date of Service:  02-27-22 @ 12:36    Patient is a 61y old  Female who presents with a chief complaint of Nausea, vomiting, diarrhea. (27 Feb 2022 00:16)      INTERVAL HISTORY: feels well       REVIEW OF SYSTEMS:   CONSTITUTIONAL: No weakness  EYES/ENT: No visual changes; No throat pain  Neck: No pain or stiffness  Respiratory: No cough, wheezing, No shortness of breath  CARDIOVASCULAR: no chest pain or palpitations  GASTROINTESTINAL: No abdominal pain, no nausea, vomiting or hematemesis  GENITOURINARY: No dysuria, frequency or hematuria  NEUROLOGICAL: No stroke like symptoms  SKIN: No rashes    	  MEDICATIONS:        PHYSICAL EXAM:  T(C): 36.8 (02-27-22 @ 04:59), Max: 37.3 (02-26-22 @ 21:12)  HR: 73 (02-27-22 @ 04:59) (70 - 73)  BP: 116/68 (02-27-22 @ 04:59) (90/54 - 123/66)  RR: 18 (02-27-22 @ 04:59) (18 - 18)  SpO2: 97% (02-27-22 @ 04:59) (95% - 98%)  Wt(kg): --  I&O's Summary        Appearance: In no distress	  HEENT:    PERRL, EOMI	  Cardiovascular:  S1 S2, No JVD  Respiratory: right base exp wheezing, L base diminished  Gastrointestinal:  Soft, Non-tender, + BS	  Vascularature:  +1 BLLE edema   Psychiatric: Appropriate affect   Neuro: no acute focal deficits                               10.9   7.31  )-----------( 417      ( 27 Feb 2022 07:29 )             33.9     02-27    141  |  105  |  9   ----------------------------<  180<H>  3.9   |  22  |  0.73    Ca    9.9      27 Feb 2022 07:28  Phos  2.7     02-27  Mg     1.7     02-27    TPro  6.8  /  Alb  3.8  /  TBili  0.1<L>  /  DBili  x   /  AST  8<L>  /  ALT  12  /  AlkPhos  76  02-27        Labs personally reviewed      ASSESSMENT/PLAN: 	    1. risk stratification  -Preop Colon risk stratification -- Low risk for low risk procedure, no contraindication to proceed  - denies cardiac history, no high risk features    2. Pancolitis flare  -hx of ulcerative colitis  - CT abdomen: Mild diffuse colonic wall thickening, most pronounced involving the ascending and transverse colon  - steroids for flare    3. hypothyroid  -continue synthroid    4. DVT proph  -lovenox SQ    5. Right Aortic arch -- Asymptomatic, outpt echo      Heidi Smith MSN, FNP-BC, AGACNP-BC, JIMY  Melecio Fisher DO [facility]  Cardiovascular Medicine  [address] [place] [address], [address]  Office: [phone]  Cell: [phone]

## 2022-02-27 NOTE — PROGRESS NOTE ADULT - PROBLEM SELECTOR PLAN 2
2/2 Ulcerative colitis flare  Follows with [facility] Dr. Tromba   - Last colonoscopy one year ago   - CT abdomen: Mild diffuse colonic wall thickening, most pronounced involving the ascending and transverse colon  - hold mesalamine, start systemic steroids for UC flare  - GI PCR not detected 2/2 Ulcerative colitis flare  Follows with [facility] Dr. Tromba   - Last colonoscopy one year ago   - CT abdomen: Mild diffuse colonic wall thickening, most pronounced involving the ascending and transverse colon  - hold mesalamine, start systemic steroids for UC flare  - GI PCR not detected  - Per GI will undergo flex sig.  - Per cards, optimized for procedure Baseline creatinine: 0.89  ANGEL likely due to low PO intake   Trend BUN/Cr

## 2022-02-27 NOTE — PROGRESS NOTE ADULT - SUBJECTIVE AND OBJECTIVE BOX
PROGRESS NOTE:     CONTACT INFO:  Derick Steward MD | PGY-1  Pager: [phone] [place], [address] [place]  Also on TEAMS  After 7pm page night float  On weekends after 1pm check resident assignment tab to see who is covering      Patient is a 61y old  Female who presents with a chief complaint of Nausea, vomiting, diarrhea. (26 Feb 2022 19:53)      SUBJECTIVE / OVERNIGHT EVENTS:    - No acute events overnight.   - No fevers/chills.  - Patient seen and evaluated at bedside.  - Denies SOB at rest, chest pain, palpitations, abdominal pain, nausea/vomiting    ADDITIONAL REVIEW OF SYSTEMS:    MEDICATIONS  (STANDING):  budesonide 160 MICROgram(s)/formoterol 4.5 MICROgram(s) Inhaler 2 Puff(s) Inhalation two times a day  enoxaparin Injectable 40 milliGRAM(s) SubCutaneous daily  escitalopram 20 milliGRAM(s) Oral daily  levothyroxine 125 MICROGram(s) Oral daily  methylPREDNISolone sodium succinate Injectable 20 milliGRAM(s) IV Push every 8 hours  pantoprazole    Tablet 40 milliGRAM(s) Oral before breakfast    MEDICATIONS  (PRN):  acetaminophen     Tablet .. 650 milliGRAM(s) Oral every 6 hours PRN Mild Pain (1 - 3), Moderate Pain (4 - 6)  ALBUTerol    90 MICROgram(s) HFA Inhaler 2 Puff(s) Inhalation every 6 hours PRN Shortness of Breath and/or Wheezing  guaifenesin/dextromethorphan Oral Liquid 10 milliLiter(s) Oral every 6 hours PRN Cough  ondansetron   Disintegrating Tablet 4 milliGRAM(s) Oral every 8 hours PRN Nausea and/or Vomiting      CAPILLARY BLOOD GLUCOSE        I&O's Summary      PHYSICAL EXAM:  Vital Signs Last 24 Hrs  T(C): 37.3 (26 Feb 2022 21:12), Max: 37.3 (26 Feb 2022 21:12)  T(F): 99.1 (26 Feb 2022 21:12), Max: 99.1 (26 Feb 2022 21:12)  HR: 70 (26 Feb 2022 21:12) (70 - 78)  BP: 123/66 (26 Feb 2022 21:12) (90/54 - 123/66)  BP(mean): --  RR: 18 (26 Feb 2022 21:12) (18 - 18)  SpO2: 98% (26 Feb 2022 21:12) (95% - 98%)    CONSTITUTIONAL: NAD, well-developed  RESPIRATORY: Normal respiratory effort; lungs are clear to auscultation bilaterally  CARDIOVASCULAR: Regular rate and rhythm, normal S1 and S2, no murmur/rub/gallop; No lower extremity edema; Peripheral pulses are 2+ bilaterally  ABDOMEN: Nontender to palpation, normoactive bowel sounds, no rebound/guarding; No hepatosplenomegaly  MUSCULOSKELETAL: no clubbing or cyanosis of digits; no joint swelling or tenderness to palpation  PSYCH: A+O to person, place, and time; affect appropriate    LABS:                        10.2   5.97  )-----------( 361      ( 26 Feb 2022 06:50 )             32.2     02-26    141  |  107  |  6<L>  ----------------------------<  117<H>  3.6   |  22  |  0.85    Ca    9.5      26 Feb 2022 06:49  Phos  3.3     02-26  Mg     1.6     02-26    TPro  6.3  /  Alb  3.5  /  TBili  0.1<L>  /  DBili  x   /  AST  11  /  ALT  11  /  AlkPhos  75  02-26              Culture - Stool (collected 24 Feb 2022 18:57)  Source: .Stool Feces  Preliminary Report (25 Feb 2022 21:05):    No enteric pathogens to date: Final culture pending    GI PCR Panel, Stool (collected 24 Feb 2022 18:57)  Source: .Stool Feces  Final Report (24 Feb 2022 22:53):    GI PCR Results: NOT detected    *******Please Note:*******    GI panel PCR evaluates for:    Campylobacter, Plesiomonas shigelloides, Salmonella,    Vibrio, Yersinia enterocolitica, Enteroaggregative    Escherichia coli (EAEC), Enteropathogenic E.coli (EPEC),    Enterotoxigenic E. coli (ETEC) lt/st, Shiga-like    toxin-producing E. coli (STEC) stx1/stx2,    Shigella/ Enteroinvasive E. coli (EIEC), Cryptosporidium,    Cyclospora cayetanensis, Entamoeba histolytica,    Giardia lamblia, Adenovirus F 40/41, Astrovirus,    Norovirus GI/GII, Rotavirus A, Sapovirus    Culture - Urine (collected 24 Feb 2022 09:20)  Source: Clean Catch Clean Catch (Midstream)  Final Report (26 Feb 2022 10:02):    >=3 organisms. Probable collection contamination.    Culture - Blood (collected 24 Feb 2022 08:25)  Source: .Blood Blood-Peripheral  Preliminary Report (25 Feb 2022 09:01):    No growth to date.    Culture - Blood (collected 24 Feb 2022 03:53)  Source: .Blood Blood-Peripheral  Preliminary Report (25 Feb 2022 04:01):    No growth to date.        RADIOLOGY & ADDITIONAL TESTS:  Results Reviewed:   Imaging Personally Reviewed:  Electrocardiogram Personally Reviewed:    COORDINATION OF CARE:  Care Discussed with Consultants/Other Providers [Y/N]: Y  Prior or Outpatient Records Reviewed [Y/N]: Y   PROGRESS NOTE:     CONTACT INFO:  Dercik Steward MD | PGY-1  Pager: [phone] [place], [address] [place]  Also on TEAMS  After 7pm page night float  On weekends after 1pm check resident assignment tab to see who is covering      Patient is a 61y old  Female who presents with a chief complaint of Nausea, vomiting, diarrhea. (26 Feb 2022 19:53)      SUBJECTIVE / OVERNIGHT EVENTS:    - No acute events overnight.   - Feels less in distress after being given steroids and says that she has not had a BM much less diarrhea   - No fevers/chills.  - Patient seen and evaluated at bedside.  - Denies SOB at rest, chest pain, palpitations, abdominal pain, nausea/vomiting    ADDITIONAL REVIEW OF SYSTEMS:    MEDICATIONS  (STANDING):  budesonide 160 MICROgram(s)/formoterol 4.5 MICROgram(s) Inhaler 2 Puff(s) Inhalation two times a day  enoxaparin Injectable 40 milliGRAM(s) SubCutaneous daily  escitalopram 20 milliGRAM(s) Oral daily  levothyroxine 125 MICROGram(s) Oral daily  methylPREDNISolone sodium succinate Injectable 20 milliGRAM(s) IV Push every 8 hours  pantoprazole    Tablet 40 milliGRAM(s) Oral before breakfast    MEDICATIONS  (PRN):  acetaminophen     Tablet .. 650 milliGRAM(s) Oral every 6 hours PRN Mild Pain (1 - 3), Moderate Pain (4 - 6)  ALBUTerol    90 MICROgram(s) HFA Inhaler 2 Puff(s) Inhalation every 6 hours PRN Shortness of Breath and/or Wheezing  guaifenesin/dextromethorphan Oral Liquid 10 milliLiter(s) Oral every 6 hours PRN Cough  ondansetron   Disintegrating Tablet 4 milliGRAM(s) Oral every 8 hours PRN Nausea and/or Vomiting      CAPILLARY BLOOD GLUCOSE        I&O's Summary      PHYSICAL EXAM:  Vital Signs Last 24 Hrs  T(C): 37.3 (26 Feb 2022 21:12), Max: 37.3 (26 Feb 2022 21:12)  T(F): 99.1 (26 Feb 2022 21:12), Max: 99.1 (26 Feb 2022 21:12)  HR: 70 (26 Feb 2022 21:12) (70 - 78)  BP: 123/66 (26 Feb 2022 21:12) (90/54 - 123/66)  BP(mean): --  RR: 18 (26 Feb 2022 21:12) (18 - 18)  SpO2: 98% (26 Feb 2022 21:12) (95% - 98%)    CONSTITUTIONAL: NAD, well-developed  RESPIRATORY: Normal respiratory effort; lungs are clear to auscultation bilaterally  CARDIOVASCULAR: Regular rate and rhythm, normal S1 and S2, no murmur/rub/gallop; No lower extremity edema; Peripheral pulses are 2+ bilaterally  ABDOMEN: Nontender to palpation, normoactive bowel sounds, no rebound/guarding; No hepatosplenomegaly  MUSCLOSKELETAL: no clubbing or cyanosis of digits; no joint swelling or tenderness to palpation  PSYCH: A+O to person, place, and time; affect appropriate    LABS:                        10.2   5.97  )-----------( 361      ( 26 Feb 2022 06:50 )             32.2     02-26    141  |  107  |  6<L>  ----------------------------<  117<H>  3.6   |  22  |  0.85    Ca    9.5      26 Feb 2022 06:49  Phos  3.3     02-26  Mg     1.6     02-26    TPro  6.3  /  Alb  3.5  /  TBili  0.1<L>  /  DBili  x   /  AST  11  /  ALT  11  /  AlkPhos  75  02-26              Culture - Stool (collected 24 Feb 2022 18:57)  Source: .Stool Feces  Preliminary Report (25 Feb 2022 21:05):    No enteric pathogens to date: Final culture pending    GI PCR Panel, Stool (collected 24 Feb 2022 18:57)  Source: .Stool Feces  Final Report (24 Feb 2022 22:53):    GI PCR Results: NOT detected    *******Please Note:*******    GI panel PCR evaluates for:    Campylobacter, Plesiomonas shigelloides, Salmonella,    Vibrio, Yersinia enterocolitica, Enteroaggregative    Escherichia coli (EAEC), Enteropathogenic E.coli (EPEC),    Enterotoxigenic E. coli (ETEC) lt/st, Shiga-like    toxin-producing E. coli (STEC) stx1/stx2,    Shigella/ Enteroinvasive E. coli (EIEC), Cryptosporidium,    Cyclospora cayetanensis, Entamoeba histolytica,    Giardia lamblia, Adenovirus F 40/41, Astrovirus,    Norovirus GI/GII, Rotavirus A, Sapovirus    Culture - Urine (collected 24 Feb 2022 09:20)  Source: Clean Catch Clean Catch (Midstream)  Final Report (26 Feb 2022 10:02):    >=3 organisms. Probable collection contamination.    Culture - Blood (collected 24 Feb 2022 08:25)  Source: .Blood Blood-Peripheral  Preliminary Report (25 Feb 2022 09:01):    No growth to date.    Culture - Blood (collected 24 Feb 2022 03:53)  Source: .Blood Blood-Peripheral  Preliminary Report (25 Feb 2022 04:01):    No growth to date.        RADIOLOGY & ADDITIONAL TESTS:  Results Reviewed:   Imaging Personally Reviewed:  Electrocardiogram Personally Reviewed:    COORDINATION OF CARE:  Care Discussed with Consultants/Other Providers [Y/N]: Y  Prior or Outpatient Records Reviewed [Y/N]: Y

## 2022-02-28 ENCOUNTER — TRANSCRIPTION ENCOUNTER (OUTPATIENT)
Age: 62
End: 2022-02-28

## 2022-02-28 VITALS
RESPIRATION RATE: 18 BRPM | HEART RATE: 71 BPM | SYSTOLIC BLOOD PRESSURE: 120 MMHG | OXYGEN SATURATION: 91 % | TEMPERATURE: 99 F | DIASTOLIC BLOOD PRESSURE: 70 MMHG

## 2022-02-28 LAB
ALBUMIN SERPL ELPH-MCNC: 3.7 G/DL — SIGNIFICANT CHANGE UP (ref 3.3–5)
ALP SERPL-CCNC: 73 U/L — SIGNIFICANT CHANGE UP (ref 40–120)
ALT FLD-CCNC: 10 U/L — SIGNIFICANT CHANGE UP (ref 10–45)
ANION GAP SERPL CALC-SCNC: 13 MMOL/L — SIGNIFICANT CHANGE UP (ref 5–17)
AST SERPL-CCNC: 9 U/L — LOW (ref 10–40)
BASOPHILS # BLD AUTO: 0.02 K/UL — SIGNIFICANT CHANGE UP (ref 0–0.2)
BASOPHILS NFR BLD AUTO: 0.1 % — SIGNIFICANT CHANGE UP (ref 0–2)
BILIRUB SERPL-MCNC: <0.1 MG/DL — LOW (ref 0.2–1.2)
BUN SERPL-MCNC: 15 MG/DL — SIGNIFICANT CHANGE UP (ref 7–23)
CALCIUM SERPL-MCNC: 9.9 MG/DL — SIGNIFICANT CHANGE UP (ref 8.4–10.5)
CHLORIDE SERPL-SCNC: 105 MMOL/L — SIGNIFICANT CHANGE UP (ref 96–108)
CO2 SERPL-SCNC: 22 MMOL/L — SIGNIFICANT CHANGE UP (ref 22–31)
CREAT SERPL-MCNC: 0.8 MG/DL — SIGNIFICANT CHANGE UP (ref 0.5–1.3)
EOSINOPHIL # BLD AUTO: 0 K/UL — SIGNIFICANT CHANGE UP (ref 0–0.5)
EOSINOPHIL NFR BLD AUTO: 0 % — SIGNIFICANT CHANGE UP (ref 0–6)
GLUCOSE SERPL-MCNC: 139 MG/DL — HIGH (ref 70–99)
HCT VFR BLD CALC: 34.4 % — LOW (ref 34.5–45)
HGB BLD-MCNC: 10.9 G/DL — LOW (ref 11.5–15.5)
IMM GRANULOCYTES NFR BLD AUTO: 1.5 % — SIGNIFICANT CHANGE UP (ref 0–1.5)
LYMPHOCYTES # BLD AUTO: 0.79 K/UL — LOW (ref 1–3.3)
LYMPHOCYTES # BLD AUTO: 5 % — LOW (ref 13–44)
MAGNESIUM SERPL-MCNC: 1.8 MG/DL — SIGNIFICANT CHANGE UP (ref 1.6–2.6)
MCHC RBC-ENTMCNC: 25.2 PG — LOW (ref 27–34)
MCHC RBC-ENTMCNC: 31.7 GM/DL — LOW (ref 32–36)
MCV RBC AUTO: 79.4 FL — LOW (ref 80–100)
MONOCYTES # BLD AUTO: 0.53 K/UL — SIGNIFICANT CHANGE UP (ref 0–0.9)
MONOCYTES NFR BLD AUTO: 3.4 % — SIGNIFICANT CHANGE UP (ref 2–14)
NEUTROPHILS # BLD AUTO: 14.14 K/UL — HIGH (ref 1.8–7.4)
NEUTROPHILS NFR BLD AUTO: 90 % — HIGH (ref 43–77)
NRBC # BLD: 0 /100 WBCS — SIGNIFICANT CHANGE UP (ref 0–0)
PHOSPHATE SERPL-MCNC: 3 MG/DL — SIGNIFICANT CHANGE UP (ref 2.5–4.5)
PLATELET # BLD AUTO: 463 K/UL — HIGH (ref 150–400)
POTASSIUM SERPL-MCNC: 4 MMOL/L — SIGNIFICANT CHANGE UP (ref 3.5–5.3)
POTASSIUM SERPL-SCNC: 4 MMOL/L — SIGNIFICANT CHANGE UP (ref 3.5–5.3)
PROT SERPL-MCNC: 6.4 G/DL — SIGNIFICANT CHANGE UP (ref 6–8.3)
RBC # BLD: 4.33 M/UL — SIGNIFICANT CHANGE UP (ref 3.8–5.2)
RBC # FLD: 14.3 % — SIGNIFICANT CHANGE UP (ref 10.3–14.5)
SODIUM SERPL-SCNC: 140 MMOL/L — SIGNIFICANT CHANGE UP (ref 135–145)
WBC # BLD: 15.72 K/UL — HIGH (ref 3.8–10.5)
WBC # FLD AUTO: 15.72 K/UL — HIGH (ref 3.8–10.5)

## 2022-02-28 PROCEDURE — 82435 ASSAY OF BLOOD CHLORIDE: CPT

## 2022-02-28 PROCEDURE — 87449 NOS EACH ORGANISM AG IA: CPT

## 2022-02-28 PROCEDURE — 87177 OVA AND PARASITES SMEARS: CPT

## 2022-02-28 PROCEDURE — 85652 RBC SED RATE AUTOMATED: CPT

## 2022-02-28 PROCEDURE — 84132 ASSAY OF SERUM POTASSIUM: CPT

## 2022-02-28 PROCEDURE — 84100 ASSAY OF PHOSPHORUS: CPT

## 2022-02-28 PROCEDURE — 87045 FECES CULTURE AEROBIC BACT: CPT

## 2022-02-28 PROCEDURE — 99285 EMERGENCY DEPT VISIT HI MDM: CPT | Mod: 25

## 2022-02-28 PROCEDURE — 82947 ASSAY GLUCOSE BLOOD QUANT: CPT

## 2022-02-28 PROCEDURE — 82803 BLOOD GASES ANY COMBINATION: CPT

## 2022-02-28 PROCEDURE — 94640 AIRWAY INHALATION TREATMENT: CPT

## 2022-02-28 PROCEDURE — 87077 CULTURE AEROBIC IDENTIFY: CPT

## 2022-02-28 PROCEDURE — 90686 IIV4 VACC NO PRSV 0.5 ML IM: CPT

## 2022-02-28 PROCEDURE — 84145 PROCALCITONIN (PCT): CPT

## 2022-02-28 PROCEDURE — 99239 HOSP IP/OBS DSCHRG MGMT >30: CPT | Mod: GC

## 2022-02-28 PROCEDURE — 36415 COLL VENOUS BLD VENIPUNCTURE: CPT

## 2022-02-28 PROCEDURE — 85014 HEMATOCRIT: CPT

## 2022-02-28 PROCEDURE — 85025 COMPLETE CBC W/AUTO DIFF WBC: CPT

## 2022-02-28 PROCEDURE — 74177 CT ABD & PELVIS W/CONTRAST: CPT | Mod: MA

## 2022-02-28 PROCEDURE — 84295 ASSAY OF SERUM SODIUM: CPT

## 2022-02-28 PROCEDURE — 83735 ASSAY OF MAGNESIUM: CPT

## 2022-02-28 PROCEDURE — 85027 COMPLETE CBC AUTOMATED: CPT

## 2022-02-28 PROCEDURE — 96374 THER/PROPH/DIAG INJ IV PUSH: CPT

## 2022-02-28 PROCEDURE — 96375 TX/PRO/DX INJ NEW DRUG ADDON: CPT

## 2022-02-28 PROCEDURE — 85610 PROTHROMBIN TIME: CPT

## 2022-02-28 PROCEDURE — 87046 STOOL CULTR AEROBIC BACT EA: CPT

## 2022-02-28 PROCEDURE — 71045 X-RAY EXAM CHEST 1 VIEW: CPT

## 2022-02-28 PROCEDURE — 85018 HEMOGLOBIN: CPT

## 2022-02-28 PROCEDURE — 80048 BASIC METABOLIC PNL TOTAL CA: CPT

## 2022-02-28 PROCEDURE — 83605 ASSAY OF LACTIC ACID: CPT

## 2022-02-28 PROCEDURE — 82330 ASSAY OF CALCIUM: CPT

## 2022-02-28 PROCEDURE — 80053 COMPREHEN METABOLIC PANEL: CPT

## 2022-02-28 PROCEDURE — 87324 CLOSTRIDIUM AG IA: CPT

## 2022-02-28 PROCEDURE — 87040 BLOOD CULTURE FOR BACTERIA: CPT

## 2022-02-28 PROCEDURE — 83036 HEMOGLOBIN GLYCOSYLATED A1C: CPT

## 2022-02-28 PROCEDURE — 81001 URINALYSIS AUTO W/SCOPE: CPT

## 2022-02-28 PROCEDURE — 83550 IRON BINDING TEST: CPT

## 2022-02-28 PROCEDURE — 83540 ASSAY OF IRON: CPT

## 2022-02-28 PROCEDURE — 87635 SARS-COV-2 COVID-19 AMP PRB: CPT

## 2022-02-28 PROCEDURE — 87086 URINE CULTURE/COLONY COUNT: CPT

## 2022-02-28 PROCEDURE — 82728 ASSAY OF FERRITIN: CPT

## 2022-02-28 PROCEDURE — 83690 ASSAY OF LIPASE: CPT

## 2022-02-28 PROCEDURE — 85730 THROMBOPLASTIN TIME PARTIAL: CPT

## 2022-02-28 PROCEDURE — 86481 TB AG RESPONSE T-CELL SUSP: CPT

## 2022-02-28 PROCEDURE — 87507 IADNA-DNA/RNA PROBE TQ 12-25: CPT

## 2022-02-28 PROCEDURE — 93005 ELECTROCARDIOGRAM TRACING: CPT

## 2022-02-28 PROCEDURE — 86140 C-REACTIVE PROTEIN: CPT

## 2022-02-28 RX ORDER — MESALAMINE 400 MG
4 TABLET, DELAYED RELEASE (ENTERIC COATED) ORAL
Qty: 56 | Refills: 0
Start: 2022-02-28 | End: 2022-03-13

## 2022-02-28 RX ORDER — INFLUENZA VIRUS VACCINE 15; 15; 15; 15 UG/.5ML; UG/.5ML; UG/.5ML; UG/.5ML
0.5 SUSPENSION INTRAMUSCULAR ONCE
Refills: 0 | Status: COMPLETED | OUTPATIENT
Start: 2022-02-28 | End: 2022-02-28

## 2022-02-28 RX ORDER — MESALAMINE 400 MG
4 TABLET, DELAYED RELEASE (ENTERIC COATED) ORAL
Qty: 0 | Refills: 0 | DISCHARGE

## 2022-02-28 RX ADMIN — PANTOPRAZOLE SODIUM 40 MILLIGRAM(S): 20 TABLET, DELAYED RELEASE ORAL at 05:05

## 2022-02-28 RX ADMIN — INFLUENZA VIRUS VACCINE 0.5 MILLILITER(S): 15; 15; 15; 15 SUSPENSION INTRAMUSCULAR at 11:48

## 2022-02-28 RX ADMIN — BUDESONIDE AND FORMOTEROL FUMARATE DIHYDRATE 2 PUFF(S): 160; 4.5 AEROSOL RESPIRATORY (INHALATION) at 05:04

## 2022-02-28 RX ADMIN — Medication 125 MICROGRAM(S): at 05:04

## 2022-02-28 RX ADMIN — Medication 20 MILLIGRAM(S): at 05:04

## 2022-02-28 RX ADMIN — ESCITALOPRAM OXALATE 20 MILLIGRAM(S): 10 TABLET, FILM COATED ORAL at 11:51

## 2022-02-28 RX ADMIN — Medication 40 MILLIGRAM(S): at 14:11

## 2022-02-28 NOTE — DISCHARGE NOTE NURSING/CASE MANAGEMENT/SOCIAL WORK - NSDCVIVACCINE_GEN_ALL_CORE_FT
No Vaccines Administered. influenza, injectable, quadrivalent, preservative free; 28-Feb-2022 11:48; Kaylene Cherry (RN); [employer]; L [id number] (Exp. Date: 29-May-2021); IntraMuscular; Deltoid Left.; 0.5 milliLiter(s); VIS (VIS Published: 06-Aug-2021, VIS Presented: 28-Feb-2022);

## 2022-02-28 NOTE — DISCHARGE NOTE NURSING/CASE MANAGEMENT/SOCIAL WORK - PATIENT PORTAL LINK FT
You can access the FollowMyHealth Patient Portal offered by [facility] by registering at the following website: [email or web address]. By joining [facility]’s FollowMyHealth portal, you will also be able to view your health information using other applications (apps) compatible with our system.

## 2022-02-28 NOTE — DISCHARGE NOTE NURSING/CASE MANAGEMENT/SOCIAL WORK - NSDCPEFALRISK_GEN_ALL_CORE
For information on Fall & Injury Prevention, visit: [email or web address] OR  [email or web address] OR  https://www.cdc.gov/steadi/patient.html

## 2022-02-28 NOTE — PROGRESS NOTE ADULT - ATTENDING COMMENTS
Pt care and plan discussed and reviewed with NP. Plan as outlined above edited by me to reflect our discussion.
Pt care and plan discussed and reviewed with NP. Plan as outlined above edited by me to reflect our discussion. Thirty five minutes spent on encounter, of which more than fifty percent of the encounter was spent on counseling and/or coordinating care by the attending physician.
discussed about plan with resident on rounds. multiple episodes of diarrhea  labs and imaging reviewed.  I agree with the above findings, assessment, and plan with the following additions and exceptions:  pancolitis on ct a/p - Stool culture negative. no leukocytosis. procalcitonin low. nontoxic appearing, bcx neg- will discuss with gi monitoring off abx   O+P pending   -Clears for now, advance diet as tolerated.   concern that mesalamine causing colitis flare - patient has not tolerated in the past   may need to restart budesonide   Gi consult pending
Admitted w/likely UC flare, symptoms improving with steroids.    Medically stable for discharge home. Will take prednisone taper and mesalamine and f/u with her outpatient GI. 35 minutes spent coordinating discharge.
Agree with resident assessment unless otherwise stated.    #UC flare: initiated on steroids today. Abx d/c-ed as infectious w/u negative. Pt endorses feeling much better but still w/ 10 BMs today. Appreciate GI recs
61F with h/o pancolitis presents with one week of abdominal discomfort and NBNB diarrhea likely 2/2 UC flare, now on systemic steroids with improvement in her BMs    # IBD with pancolitis: initially on abx, GI infectious workup negative, abx DC'd, started on steroids with improvement in her sxs and Bms.  c/w BM count. Discussed with Dr. Perez, No plan for flex sig at this time. Continue to monitor. f/u GI recs.

## 2022-02-28 NOTE — PROGRESS NOTE ADULT - PROBLEM SELECTOR PLAN 6
- c/w escitalopram   - CTM

## 2022-02-28 NOTE — PROGRESS NOTE ADULT - SUBJECTIVE AND OBJECTIVE BOX
Chief Complaint:  Patient is a 61y old  Female who presents with a chief complaint of Nausea, vomiting, diarrhea. (28 Feb 2022 07:11)      Date of service 02-28-22 @ 12:08      Interval Events:   patient seen and examined.   Feels well, no abdominal pain or nausea.  Tolerating PO intake  States stools are starting to be more formed now.     Hospital Medications:  acetaminophen     Tablet .. 650 milliGRAM(s) Oral every 6 hours PRN  ALBUTerol    90 MICROgram(s) HFA Inhaler 2 Puff(s) Inhalation every 6 hours PRN  budesonide 160 MICROgram(s)/formoterol 4.5 MICROgram(s) Inhaler 2 Puff(s) Inhalation two times a day  enoxaparin Injectable 40 milliGRAM(s) SubCutaneous daily  escitalopram 20 milliGRAM(s) Oral daily  guaifenesin/dextromethorphan Oral Liquid 10 milliLiter(s) Oral every 6 hours PRN  levothyroxine 125 MICROGram(s) Oral daily  methylPREDNISolone sodium succinate Injectable 20 milliGRAM(s) IV Push every 8 hours  ondansetron   Disintegrating Tablet 4 milliGRAM(s) Oral every 8 hours PRN  pantoprazole    Tablet 40 milliGRAM(s) Oral before breakfast        Review of Systems:  General:  No wt loss, fevers, chills, night sweats, fatigue,   Eyes:  Good vision, no reported pain  ENT:  No sore throat, pain, runny nose, dysphagia  CV:  No pain, palpitations, hypo/hypertension  Resp:  No dyspnea, cough, tachypnea, wheezing  GI:  See HPI  :  No pain, bleeding, incontinence, nocturia  Muscle:  No pain, weakness  Neuro:  No weakness, tingling, memory problems  Psych:  No fatigue, insomnia, mood problems, depression  Endocrine:  No polyuria, polydipsia, cold/heat intolerance  Heme:  No petechiae, ecchymosis, easy bruisability  Integumentary:  No rash, edema    PHYSICAL EXAM:   Vital Signs:  Vital Signs Last 24 Hrs  T(C): 36.7 (28 Feb 2022 04:53), Max: 37.3 (27 Feb 2022 19:37)  T(F): 98 (28 Feb 2022 04:53), Max: 99.1 (27 Feb 2022 19:37)  HR: 70 (28 Feb 2022 05:10) (68 - 77)  BP: 126/72 (28 Feb 2022 05:10) (98/77 - 126/72)  BP(mean): --  RR: 18 (28 Feb 2022 04:53) (18 - 18)  SpO2: 95% (28 Feb 2022 04:53) (95% - 98%)  Daily     Daily       PHYSICAL EXAM:     GENERAL:  Appears stated age, well-groomed, well-nourished, no distress  HEENT:  NC/AT,  conjunctivae anicteric, clear and pink,   NECK: supple, trachea midline  CHEST:  Full & symmetric excursion, no increased effort, breath sounds clear  HEART:  Regular rhythm, no JVD  ABDOMEN:  Soft, non-tender, non-distended, normoactive bowel sounds,  no masses , no hepatosplenomegaly  EXTREMITIES:  no cyanosis,clubbing or edema  SKIN:  No rash, erythema, or, ecchymoses, no jaundice  NEURO:  Alert, non-focal, no asterixis  PSYCH: Appropriate affect, oriented to place and time  RECTAL: Deferred      LABS Personally reviewed by me:                        10.9   15.72 )-----------( 463      ( 28 Feb 2022 07:17 )             34.4     Mean Cell Volume: 79.4 fl (02-28-22 @ 07:17)    02-28    140  |  105  |  15  ----------------------------<  139<H>  4.0   |  22  |  0.80    Ca    9.9      28 Feb 2022 07:15  Phos  3.0     02-28  Mg     1.8     02-28    TPro  6.4  /  Alb  3.7  /  TBili  <0.1<L>  /  DBili  x   /  AST  9<L>  /  ALT  10  /  AlkPhos  73  02-28    LIVER FUNCTIONS - ( 28 Feb 2022 07:15 )  Alb: 3.7 g/dL / Pro: 6.4 g/dL / ALK PHOS: 73 U/L / ALT: 10 U/L / AST: 9 U/L / GGT: x                                       10.9   15.72 )-----------( 463      ( 28 Feb 2022 07:17 )             34.4                         10.9   7.31  )-----------( 417      ( 27 Feb 2022 07:29 )             33.9                         10.2   5.97  )-----------( 361      ( 26 Feb 2022 06:50 )             32.2       Imaging personally reviewed by me:

## 2022-02-28 NOTE — PROGRESS NOTE ADULT - PROBLEM SELECTOR PLAN 1
2/2 Ulcerative colitis flare  Follows with [facility]. Tromba   - Last colonoscopy one year ago   - CT abdomen: Mild diffuse colonic wall thickening, most pronounced involving the ascending and transverse colon  - hold mesalamine, start systemic steroids for UC flare  - GI PCR not detected  - Per GI will defer colo.   - Per cards, optimized for procedure  - Quant gold sent, counseled patient about humera

## 2022-02-28 NOTE — PROGRESS NOTE ADULT - SUBJECTIVE AND OBJECTIVE BOX
Date of Service   02-28-22 @ 13:19    Patient is a 61y old  Female who presents with a chief complaint of Nausea, vomiting, diarrhea. (28 Feb 2022 12:08)      INTERVAL HISTORY: pt feels much better   REVIEW OF SYSTEMS:   CONSTITUTIONAL: No weakness  EYES/ENT: No visual changes; No throat pain  Neck: No pain or stiffness  Respiratory: No cough, wheezing, No shortness of breath  CARDIOVASCULAR: no chest pain or palpitations  GASTROINTESTINAL: No abdominal pain, no nausea, vomiting or hematemesis  GENITOURINARY: No dysuria, frequency or hematuria  NEUROLOGICAL: No stroke like symptoms  SKIN: No rashes    	  MEDICATIONS:        PHYSICAL EXAM:  T(C): 36.7 (02-28-22 @ 04:53), Max: 37.3 (02-27-22 @ 19:37)  HR: 70 (02-28-22 @ 05:10) (68 - 77)  BP: 126/72 (02-28-22 @ 05:10) (98/77 - 126/72)  RR: 18 (02-28-22 @ 04:53) (18 - 18)  SpO2: 95% (02-28-22 @ 04:53) (95% - 98%)  Wt(kg): --  I&O's Summary        Appearance: In no distress	  HEENT:    PERRL, EOMI	  Cardiovascular:  S1 S2, No JVD  Respiratory: Lungs clear to auscultation	  Gastrointestinal:  Soft, Non-tender, + BS	  Vascularature:  No edema of LE  Psychiatric: Appropriate affect   Neuro: no acute focal deficits                               10.9   15.72 )-----------( 463      ( 28 Feb 2022 07:17 )             34.4     02-28    140  |  105  |  15  ----------------------------<  139<H>  4.0   |  22  |  0.80    Ca    9.9      28 Feb 2022 07:15  Phos  3.0     02-28  Mg     1.8     02-28    TPro  6.4  /  Alb  3.7  /  TBili  <0.1<L>  /  DBili  x   /  AST  9<L>  /  ALT  10  /  AlkPhos  73  02-28        Labs personally reviewed      ASSESSMENT/PLAN:   	  1. Risk stratification  - Preop Colon risk stratification -  - Low risk for low risk procedure, no contraindication to proceed  - denies cardiac history, no high risk features    2. Pancolitis flare  - hx of ulcerative colitis  - CT abdomen: Mild diffuse colonic wall thickening, most pronounced involving the ascending and transverse colon  - steroids for flare  - feels much better today   - GI holding any procedure per note    3. Hypothyroid  - continue synthroid    4. DVT proph  - lovenox SQ    5. Right Aortic arch -- Asymptomatic, outpt echo        Petra Angela [facility]   Melecio Fisher DO [facility]  Cardiovascular Medicine  [address], [address]  Office: [phone]  Cell: [phone]

## 2022-02-28 NOTE — PROGRESS NOTE ADULT - ASSESSMENT
1. UC flare. Seems much improved today.  -can DC on prednisone taper, start at 40mg, then 30mg , taper by 10mg per week  -no GI objection to DC today  -f/u with GI Dr. Hernandez     2. Nausea  -cw prn antiemetics    3. Fevers, resolved  -BC NGTD     4. Hypothyroid  -on levothyroxine      Attending supervision statement: I have personally seen and examined the patient. I fully participated in the care of this patient. I have made amendments to the documentation where necessary, and agree with the history, physical exam, and plan as outlined by the ACP.      [facility]   Gastroenterology and Hepatology  [address] [place], [place]  Office: [phone]  Cell: [phone]

## 2022-02-28 NOTE — PROGRESS NOTE ADULT - SUBJECTIVE AND OBJECTIVE BOX
Charles High, PGY1  Internal Medicine      PATIENT: UVALDO BROWN, MRN: [id number]    CHIEF COMPLAINT: Patient is a 61y old  Female who presents with a chief complaint of Nausea, vomiting, diarrhea. (27 Feb 2022 19:30)      INTERVAL HISTORY/OVERNIGHT EVENTS:   - No overnight events. Breathing comf on RA.   - denies CP, SOB, n/v/d, abd pain, dysuria.   - AOx3    REVIEW OF SYSTEMS:  Negative unless noted in HPI      MEDICATIONS:  MEDICATIONS  (STANDING):  budesonide 160 MICROgram(s)/formoterol 4.5 MICROgram(s) Inhaler 2 Puff(s) Inhalation two times a day  enoxaparin Injectable 40 milliGRAM(s) SubCutaneous daily  escitalopram 20 milliGRAM(s) Oral daily  levothyroxine 125 MICROGram(s) Oral daily  methylPREDNISolone sodium succinate Injectable 20 milliGRAM(s) IV Push every 8 hours  pantoprazole    Tablet 40 milliGRAM(s) Oral before breakfast    MEDICATIONS  (PRN):  acetaminophen     Tablet .. 650 milliGRAM(s) Oral every 6 hours PRN Mild Pain (1 - 3), Moderate Pain (4 - 6)  ALBUTerol    90 MICROgram(s) HFA Inhaler 2 Puff(s) Inhalation every 6 hours PRN Shortness of Breath and/or Wheezing  guaifenesin/dextromethorphan Oral Liquid 10 milliLiter(s) Oral every 6 hours PRN Cough  ondansetron   Disintegrating Tablet 4 milliGRAM(s) Oral every 8 hours PRN Nausea and/or Vomiting      ALLERGIES: Allergies    No Known Allergies    Intolerances        OBJECTIVE:    VITALS:   Vital Signs Last 24 Hrs  T(C): 36.7 (28 Feb 2022 04:53), Max: 37.3 (27 Feb 2022 19:37)  T(F): 98 (28 Feb 2022 04:53), Max: 99.1 (27 Feb 2022 19:37)  HR: 70 (28 Feb 2022 05:10) (68 - 77)  BP: 126/72 (28 Feb 2022 05:10) (98/77 - 126/72)  BP(mean): --  RR: 18 (28 Feb 2022 04:53) (18 - 18)  SpO2: 95% (28 Feb 2022 04:53) (95% - 98%)    CAPILLARY BLOOD GLUCOSE          I&O's Summary    Daily     Daily     PHYSICAL EXAMINATION:  General: Comfortable, no acute distress, cooperative with exam.  HEENT: PERRLA, EOMI, moist mucous membranes.  Respiratory: CTAB, normal respiratory effort, no coughing, wheezes, crackles, or rales.  CV: RRR, S1S2, no murmurs, rubs or gallops. No JVD. Distal pulses intact.  Abdominal: Soft, nontender, nondistended, no rebound or guarding, normal bowel sounds.  Neurology: AOx3, no focal neuro defects, BATES x 4.  Extremities: No pitting edema, + Peripheral pulses.      LABS:                        10.9   7.31  )-----------( 417      ( 27 Feb 2022 07:29 )             33.9     02-27    141  |  105  |  9   ----------------------------<  180<H>  3.9   |  22  |  0.73    Ca    9.9      27 Feb 2022 07:28  Phos  2.7     02-27  Mg     1.7     02-27    TPro  6.8  /  Alb  3.8  /  TBili  0.1<L>  /  DBili  x   /  AST  8<L>  /  ALT  12  /  AlkPhos  76  02-27                MICRO:  Microbiology     EKG:    RADIOLOGY & ADDITIONAL TESTS:    IMAGING:

## 2022-02-28 NOTE — PROGRESS NOTE ADULT - PROVIDER SPECIALTY LIST ADULT
Cardiology
Gastroenterology
Cardiology
Gastroenterology
Gastroenterology
Internal Medicine

## 2022-02-28 NOTE — PROGRESS NOTE ADULT - REASON FOR ADMISSION
Nausea, vomiting, diarrhea.

## 2022-03-01 ENCOUNTER — NON-APPOINTMENT (OUTPATIENT)
Age: 62
End: 2022-03-01

## 2022-03-01 LAB
CULTURE RESULTS: SIGNIFICANT CHANGE UP
CULTURE RESULTS: SIGNIFICANT CHANGE UP
SPECIMEN SOURCE: SIGNIFICANT CHANGE UP
SPECIMEN SOURCE: SIGNIFICANT CHANGE UP

## 2022-03-04 ENCOUNTER — APPOINTMENT (OUTPATIENT)
Dept: GASTROENTEROLOGY | Facility: CLINIC | Age: 62
End: 2022-03-04
Payer: COMMERCIAL

## 2022-03-04 VITALS
OXYGEN SATURATION: 98 % | TEMPERATURE: 97.8 F | HEIGHT: 67 IN | DIASTOLIC BLOOD PRESSURE: 80 MMHG | BODY MASS INDEX: 36.88 KG/M2 | HEART RATE: 80 BPM | SYSTOLIC BLOOD PRESSURE: 110 MMHG | WEIGHT: 235 LBS

## 2022-03-04 DIAGNOSIS — R19.4 CHANGE IN BOWEL HABIT: ICD-10-CM

## 2022-03-04 LAB
GAMMA INTERFERON BACKGROUND BLD IA-ACNC: 0.06 IU/ML — SIGNIFICANT CHANGE UP
M TB IFN-G BLD-IMP: NEGATIVE — SIGNIFICANT CHANGE UP
M TB IFN-G CD4+ BCKGRND COR BLD-ACNC: 0 IU/ML — SIGNIFICANT CHANGE UP
M TB IFN-G CD4+CD8+ BCKGRND COR BLD-ACNC: 0 IU/ML — SIGNIFICANT CHANGE UP
QUANT TB PLUS MITOGEN MINUS NIL: 2.66 IU/ML — SIGNIFICANT CHANGE UP

## 2022-03-04 PROCEDURE — 99213 OFFICE O/P EST LOW 20 MIN: CPT

## 2022-03-04 NOTE — ASSESSMENT
[FreeTextEntry1] : The patient is a 61-year-old female with a known history of ulcerative colitis.  Her exams over the years have revealed microscopic disease with only intermittent flareups.  Also recently she had a significant flareup that required hospitalization.  She has responded steroids and is currently on 20 mg once a day.  I told the patient to continue this dosage for another 2 weeks and come back to see me.  I reiterated a discussion I had many times with this patient regarding the fact that she has not done well with mesalamine or budesonide.  I feel unequivocally that the patient should be and should have been on a biologic agent.  I did go over the side effects which are quite rare and the mistaken notion that it will severely depress her immune system.  She would probably be a better candidate for Entyvio which is more got specific but does require an intermittent infusion.  The patient will get back to me with a decision in 2 weeks when she comes for a follow-up exam.

## 2022-03-04 NOTE — HISTORY OF PRESENT ILLNESS
[FreeTextEntry1] : Patient Ceci Pereira in the office today.  Patient is a 61-year-old female with a history of hypothyroidism.  Patient has no history of diabetes or coronary artery disease and her appetite is generally good with no dysphagia or unexplained weight loss.  The patient has been followed for ulcerative colitis over the years and had a colonoscopy done approximately 1-1/2 years ago.  On all her exams the patient does have evidence of microscopic disease with intermittent symptoms.  The patient had been on budesonide but most recently had noticed an increase in her symptoms with tenesmus loose bowel movements and some blood in her stool.  Mesalamine was added to the patient's regimen but subsequently the symptoms progressed to the point where the patient went to the emergency room.  A CAT scan did reveal pancolitis and the patient was started on steroids.  Stool testing was negative for infections.  The patient is currently on 20 mg of prednisone a day with no further tenesmus or blood in the stool.  Stools are still on the softer side.  Previous discussions with this patient indicated that I thought she should go on an immunosuppressive or biologic agent.  She has  adamantly refused these medications due to the fear of side effects.  The patient does not abuse tobacco caffeine or ethanol.

## 2022-03-04 NOTE — REVIEW OF SYSTEMS
[Fever] : no fever [Chills] : no chills [As Noted in HPI] : as noted in HPI [Negative] : Respiratory [FreeTextEntry7] : Acute exacerbation of colitis with bloody diarrhea and tenesmus.

## 2022-03-16 ENCOUNTER — NON-APPOINTMENT (OUTPATIENT)
Age: 62
End: 2022-03-16

## 2022-03-18 ENCOUNTER — APPOINTMENT (OUTPATIENT)
Dept: GASTROENTEROLOGY | Facility: CLINIC | Age: 62
End: 2022-03-18
Payer: COMMERCIAL

## 2022-03-18 VITALS
SYSTOLIC BLOOD PRESSURE: 110 MMHG | DIASTOLIC BLOOD PRESSURE: 80 MMHG | TEMPERATURE: 98.8 F | WEIGHT: 228 LBS | OXYGEN SATURATION: 99 % | HEART RATE: 70 BPM | HEIGHT: 67 IN | BODY MASS INDEX: 35.79 KG/M2

## 2022-03-18 DIAGNOSIS — M19.90 UNSPECIFIED OSTEOARTHRITIS, UNSPECIFIED SITE: ICD-10-CM

## 2022-03-18 PROCEDURE — 99213 OFFICE O/P EST LOW 20 MIN: CPT

## 2022-03-18 NOTE — ASSESSMENT
[FreeTextEntry1] : The patient is a 61-year-old female who has a known history of ulcerative proctosigmoiditis.  The patient has been on ASA compounds on budesonide but has noted significant flareups 1 of which required hospitalization.  A second hospitalization also occurred due to the fact she was having palpitations from high-dose steroids.  The patient is currently on 20 mg once a day and we will continue this regimen for another week to 10 days.  The patient is finally agreed to progress in her treatment and I will attempt to get authorization for Entyvio.  Is the most got specific agent with the least side effects.  We will attempt to decrease her steroids over the next several weeks.  Once I get authorization for the medication we will start the infusions.

## 2022-03-18 NOTE — HISTORY OF PRESENT ILLNESS
[FreeTextEntry1] : Saw patient Ceci Pereira in follow-up today.  This patient has been followed over the years for colorectal cancer screening and has a known history of ulcerative proctosigmoiditis.  Patient has been significantly noncompliant over the years with her medication and surveillance.  The patient had been on budesonide and noticed a significant flareup in her symptoms.  The patient had 2 hospitalizations because of her symptoms and while on steroids to develop palpitations and was admitted for a work-up.  Currently the patient is on 20 mg of prednisone a day and states that her bowel movements are 1-3 a day and are starting to form up.  Patient has no abdominal cramps nausea vomiting fever or chills.  The patient does have a history of hypothyroidism as well as GERD and is currently on a proton pump inhibitor.  The patient has agreed to biologic treatment which was recommended a number of years ago.  TB testing in the hospital was negative.

## 2022-03-25 RX ORDER — VEDOLIZUMAB 300 MG/5ML
300 INJECTION, POWDER, LYOPHILIZED, FOR SOLUTION INTRAVENOUS
Qty: 3 | Refills: 3 | Status: ACTIVE | COMMUNITY
Start: 2022-03-25 | End: 1900-01-01

## 2022-03-30 ENCOUNTER — NON-APPOINTMENT (OUTPATIENT)
Age: 62
End: 2022-03-30

## 2022-04-07 ENCOUNTER — TRANSCRIPTION ENCOUNTER (OUTPATIENT)
Age: 62
End: 2022-04-07

## 2022-04-07 ENCOUNTER — RX RENEWAL (OUTPATIENT)
Age: 62
End: 2022-04-07

## 2022-04-07 RX ORDER — MESALAMINE 1.2 G/1
1.2 TABLET, DELAYED RELEASE ORAL DAILY
Qty: 120 | Refills: 5 | Status: ACTIVE | COMMUNITY
Start: 2022-02-07 | End: 1900-01-01

## 2022-04-26 ENCOUNTER — NON-APPOINTMENT (OUTPATIENT)
Age: 62
End: 2022-04-26

## 2022-07-13 ENCOUNTER — APPOINTMENT (OUTPATIENT)
Dept: ORTHOPEDIC SURGERY | Facility: CLINIC | Age: 62
End: 2022-07-13

## 2022-07-13 VITALS — HEIGHT: 67 IN | WEIGHT: 228 LBS | BODY MASS INDEX: 35.79 KG/M2

## 2022-07-13 DIAGNOSIS — M17.12 UNILATERAL PRIMARY OSTEOARTHRITIS, LEFT KNEE: ICD-10-CM

## 2022-07-13 PROCEDURE — 20611 DRAIN/INJ JOINT/BURSA W/US: CPT

## 2022-07-13 PROCEDURE — 99204 OFFICE O/P NEW MOD 45 MIN: CPT | Mod: 25

## 2022-07-13 PROCEDURE — 73564 X-RAY EXAM KNEE 4 OR MORE: CPT | Mod: 50

## 2022-07-13 PROCEDURE — J3490M: CUSTOM

## 2022-07-13 NOTE — PROCEDURE
[FreeTextEntry3] : Large joint injection was performed of the right knee. An injection of Lidocaine 3cc of 1% , Bupivacaine (Marcaine) 6cc of 0.5% , Triamcinolone (Kenalog) 2cc of 40 mg  was used. Patient was advised to call if redness, pain or fever occur and apply ice for 15 minutes out of every hour for the next 12-24 hours as tolerated. \par \par Patient has tried OTC's including aspirin, Ibuprofen, Aleve, etc or prescription NSAIDS, and/or exercises at home and/or physical therapy without satisfactory response, patient had decreased mobility in the joint and the risks benefits, and alternatives have been discussed, and verbal consent was obtained. \par The site was prepped with alcohol, betadine and ethyl chloride sprayed topically\par \par The risks, benefits and contents of the injection have been discussed.  Risks include but are not limited to allergic reaction, flare reaction, permanent white skin discoloration at the injection site and infection.  The patient understands the risks and agrees to having the injection.  All questions have been answered.\par \par Ultrasound guidance was indicated for this patient due to prior failure or difficult injection. All ultrasound images have been permanently captured and stored accordingly in our picture archiving and communication system.\par

## 2022-07-13 NOTE — HISTORY OF PRESENT ILLNESS
[de-identified] : 7/13/22: 61F who presents today for B/L knee pain. Patient fell 2.5 weeks ago on 6/27/22. Right knee pain is new and the left knee has been chronic. Swelling and sharp pain in the right knee w/ activity and at rest. 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 No recent treatment.

## 2022-07-13 NOTE — DISCUSSION/SUMMARY
[de-identified] : 61f with b/l knee djd, right knee itb syndrome\par 1) csi right knee today - tolerated well \par 2) discussed visco injections for the left knee, pt would like to proceed, will request auth\par 3) cryotherapy, rest and activity modification\par 4) rtc with auth for injections\par \par \par Entered by Kiara East acting as scribe.

## 2022-08-08 NOTE — ED ADULT TRIAGE NOTE - NS ED TRIAGE AVPU SCALE
Cardiac Electrophysiology Outpatient Follow Up Note            [facility]    Follow Up Office Visit      Arash Simmons  [id number]  08/08/2022  [unfilled]  [unfilled]    Primary Care Physician: Sundeep Stone MD    Referred By: No ref. provider found    Subjective     Chief Complaint:   Diagnoses and all orders for this visit:    1. Paroxysmal atrial fibrillation (HCC) (Primary)    2. Long term current use of antiarrhythmic drug    3. ICD (implantable cardioverter-defibrillator), dual, in situ    4. LBBB (left bundle branch block)    5. NICM (nonischemic cardiomyopathy) (HCC)    6. Primary hypertension    7. Chronic systolic congestive heart failure (HCC)      Chief Complaint   Patient presents with   • NICM (nonischemic cardiomyopathy)       History of Present Illness:   Arash Simmons is a 60 y.o. male who presents to my electrophysiology clinic for follow up of as above.  Arash is doing reasonably well.  A lot of stress at home.  35-year-old stepson is back living with them after some trouble with the law.    Drinks beer in the evening and especially in the weekend.  No chest pain nausea vomit fevers or chills.    Does note the A. fib.  Makes him feel poorly had 1 episode that lasted 7 days in duration.      Review of Systems:   Constitutional: No fevers or chills, no recent weight gain or weight loss or fatigue  Eyes: No visual loss, blurred vision, double vision, yellow sclerae.  ENT: No headaches, hearing loss, vertigo, congestion or sore throat.   Cardiovascular: Per HPI  Respiratory: No cough or wheezing, no sputum production, no hematemesis   Gastrointestinal: No abdominal pain, no nausea, vomiting, constipation, diarrhea, melena.   Genitourinary: No dysuria, hematuria or increased frequency.    Past Medical History:   Past Medical History:   Diagnosis Date   • Anxiety    • Arthritis    • Atrial fibrillation (HCC)    • CHF (congestive heart  failure) (HCC)    • Dyspnea on exertion    • Hyperlipidemia    • Hypertension    • LBBB (left bundle branch block)    • Lower back pain    • Shortness of breath        Past Surgical History:   Past Surgical History:   Procedure Laterality Date   • ADENOIDECTOMY     • ANTERIOR CERVICAL DISCECTOMY W/ FUSION N/A 2018    Procedure: ANTERIOR CERVICAL DECOMPRESSION WITH FUSION C4-5 RIGHT;  Surgeon: Israel Garcia MD;  Location:  [facility];  Service: Orthopedic Spine   • CARDIAC CATHETERIZATION  2017    X2   • CARDIAC DEFIBRILLATOR PLACEMENT      BSC biventricular ICD 2015   • CARDIAC ELECTROPHYSIOLOGY PROCEDURE N/A 2022    Procedure: Ablation atrial fibrillation, Rhythmia. Stop Sotalol 3 days prior to procedure;  Surgeon: Sal Verdugo DO;  Location:  [facility];  Service: Cardiovascular;  Laterality: N/A;   • COLONOSCOPY     • ELBOW PROCEDURE Right    • PACEMAKER IMPLANTATION      ICD, PLACED PER [facility]IG[facility]INS[facility] AND NOW F/U WITH [facility]STANT[facility]    • TONSILLECTOMY     • TOTAL KNEE ARTHROPLASTY Left 10/2017       Family History:   Family History   Problem Relation Age of Onset   • COPD Mother    • Emphysema Mother    • Diabetes Mother    • COPD Father    • Other Father         black lung        Social History:   Social History     Socioeconomic History   • Marital status:    Tobacco Use   • Smoking status: Former Smoker     Packs/day: 2.00     Years: 30.00     Pack years: 60.00     Quit date: 1999     Years since quittin.0   • Smokeless tobacco: Never Used   Vaping Use   • Vaping Use: Never used   Substance and Sexual Activity   • Alcohol use: Yes     Comment: 2 beers per day   • Drug use: No   • Sexual activity: Defer     Partners: Female     Comment:        Medications:     Current Outpatient Medications:   •  apixaban (ELIQUIS) 5 MG tablet tablet, Take 1 tablet by mouth Every 12 (Twelve) Hours., Disp: 365 tablet, Rfl: 3  •  atorvastatin (LIPITOR) 10 MG tablet, Take 1  "tablet by mouth Every Night., Disp: 90 tablet, Rfl: 1  •  FLUoxetine (PROzac) 20 MG tablet, Take 2 tablets by mouth Daily., Disp: 180 tablet, Rfl: 1  •  furosemide (LASIX) 40 MG tablet, Take 1 tablet by mouth once daily, Disp: 30 tablet, Rfl: 2  •  metoprolol succinate XL (TOPROL-XL) 50 MG 24 hr tablet, Take 1 tablet by mouth Daily., Disp: 90 tablet, Rfl: 3  •  Multiple Vitamins-Iron (MULTI-VITAMIN/IRON PO), Take 1 tablet by mouth Daily., Disp: , Rfl:   •  pantoprazole (Protonix) 40 MG EC tablet, Take 1 tablet by mouth Daily., Disp: 90 tablet, Rfl: 0  •  sacubitril-valsartan (Entresto) 24-26 MG tablet, Take 1 tablet by mouth Every 12 (Twelve) Hours., Disp: 180 tablet, Rfl: 1  •  Sotalol HCl AF 80 MG tablet, On tablet in AM and 2 tablet in PM, Disp: 180 tablet, Rfl: 3  •  spironolactone (ALDACTONE) 25 MG tablet, Take 1 tablet by mouth once daily, Disp: 90 tablet, Rfl: 0    Allergies:   No Known Allergies    Objective   Vital Signs:   Vitals:    08/08/22 1010   BP: 112/60   BP Location: Right arm   Patient Position: Sitting   Pulse: 70   SpO2: 96%   Weight: 94.8 kg (209 lb)   Height: 177.8 cm (70\")       PHYSICAL EXAM  General appearance: Awake, alert, cooperative  Head: Normocephalic, without obvious abnormality, atraumatic  Eyes: Conjunctivae/corneas clear, EOMs intact  Neck: no adenopathy, no carotid bruit, no JVD and thyroid: not enlarged  Lungs: clear to auscultation bilaterally and no rhonchi or crackles\", ' symmetric  Heart: regular rate and rhythm, S1, S2 normal, no murmur, click, rub or gallop  Abdomen: Soft, non-tender, bowel sounds normal,  no organomegaly  Extremities: extremities normal, atraumatic, no cyanosis or edema  Skin: Skin color, turgor normal, no rashes or lesions  Neurologic: Grossly normal     Lab Results   Component Value Date    GLUCOSE 102 (H) 05/04/2022    CALCIUM 9.9 05/04/2022     05/04/2022    K 4.5 05/04/2022    CO2 27.0 05/04/2022     05/04/2022    BUN 17 05/04/2022    " CREATININE 0.93 05/04/2022    EGFRIFAFRI 100 12/22/2021    EGFRIFNONA 83 12/22/2021    BCR 18.3 05/04/2022    ANIONGAP 11.0 05/04/2022     Lab Results   Component Value Date    WBC 7.64 05/04/2022    HGB 13.6 05/04/2022    HCT 40.4 05/04/2022    MCV 93.5 05/04/2022     05/04/2022     Lab Results   Component Value Date    INR 0.99 10/11/2017    INR 1.02 08/20/2015    PROTIME 10.7 08/20/2015     Lab Results   Component Value Date    TSH 2.480 05/04/2022       Cardiac Testing:     I personally viewed and interpreted the patient's EKG/Telemetry/lab data    Procedures    Tobacco Cessation: N/A  Obstructive Sleep Apnea Screening: Completed    Assessment & Plan    Diagnoses and all orders for this visit:    1. Paroxysmal atrial fibrillation (HCC) (Primary)    2. Long term current use of antiarrhythmic drug    3. ICD (implantable cardioverter-defibrillator), dual, in situ    4. LBBB (left bundle branch block)    5. NICM (nonischemic cardiomyopathy) (HCC)    6. Primary hypertension    7. Chronic systolic congestive heart failure (HCC)         Diagnosis Plan   1. Paroxysmal atrial fibrillation (HCC)   highly symptomatic recurrent paroxysmal A. fib.  He is now 3 months out of left atrial ablation from May of this year.    He has had intermittent episodes of A. fib in the context of significant and not small amount of stress at home and some liberal alcohol consumption.    Moving forward he is now out of the blanking.  Any A. fib that he occurs at this point moving forward actually is a failure of ablation therapy.  We discussed that it is important and helpful for him to reduce his alcohol consumption and if possible realistically reduce stress at home.    Lifelong anticoagulation is indicated due to elevated risk of stroke   2. Long term current use of antiarrhythmic drug   sotalol.  Doing well.   3. ICD (implantable cardioverter-defibrillator), dual, in situ   transvenous ICD system in situ.  Resynchronization ICD  manufactured by [employer].  11% A. fib burden.  All within the blanking period.      This patient's Cardiac Implanted Electronic Device was manually interrogated and reprogrammed during the patient encounter today.  Iterative programming changes were manually made to determine the sensing threshold, pacing threshold, lead impedance as well as underlying cardiac rhythm.  These programming changes were not limited to but included some or all of the following when appropriate: pacing mode, programmed AV delays, blanking periods, and refractory periods.  Data obtained as a result of these manual programing changes informed the patient's CIED permanent programming.   4. LBBB (left bundle branch block)   CRT in situ.   5. NICM (nonischemic cardiomyopathy) (HCC)   euvolemic.  Good medical therapy Toprol-XL Entresto.   6. Primary hypertension   blood pressure reasonably well controlled.   7. Chronic systolic congestive heart failure (HCC)   euvolemic.  No further diuresis required.     Body mass index is 29.99 kg/m².    I spent 39 minutes in consultation with this patient which included more than 65% of this time in direct face-to-face counseling, physical examination and discussion of my assessment and findings and shared decision making with the patient.  The remainder of the time not spent face to face was performing one, some or all of the following actions:  preparing to see this patient ( eg. Review of tests),  ordering medications, tests or procedures ), care coordination, discussion of the plan with other healthcare providers, documenting clinical information in Epic well as independently interpreting results and communicating results to patient, family and or caregiver.  All time noted occurred on the date of service.    Follow Up:       Thank you for allowing me to participate in the care of your patient. Please to not hesitate to contact me with additional questions or concerns.      Sal Verdugo DO,  FACC, RS  Cardiac Electrophysiologist  [facility]             Alert-The patient is alert, awake and responds to voice. The patient is oriented to time, place, and person. The triage nurse is able to obtain subjective information.

## 2022-10-12 ENCOUNTER — APPOINTMENT (OUTPATIENT)
Dept: OBGYN | Facility: CLINIC | Age: 62
End: 2022-10-12

## 2022-10-12 VITALS — WEIGHT: 230 LBS | BODY MASS INDEX: 36.02 KG/M2 | SYSTOLIC BLOOD PRESSURE: 118 MMHG | DIASTOLIC BLOOD PRESSURE: 78 MMHG

## 2022-10-12 DIAGNOSIS — Z01.419 ENCOUNTER FOR GYNECOLOGICAL EXAMINATION (GENERAL) (ROUTINE) W/OUT ABNORMAL FINDINGS: ICD-10-CM

## 2022-10-12 PROCEDURE — 99396 PREV VISIT EST AGE 40-64: CPT

## 2022-10-13 LAB — HPV HIGH+LOW RISK DNA PNL CVX: NOT DETECTED

## 2022-10-18 LAB — CYTOLOGY CVX/VAG DOC THIN PREP: ABNORMAL

## 2022-11-22 ENCOUNTER — NON-APPOINTMENT (OUTPATIENT)
Age: 62
End: 2022-11-22

## 2022-12-27 ENCOUNTER — FORM ENCOUNTER (OUTPATIENT)
Age: 62
End: 2022-12-27

## 2023-01-05 NOTE — DISCHARGE NOTE PROVIDER - NSRESEARCHGRANT_HIDDEN_GEN_A_CORE
Chief Complaint  Chief Complaint   Patient presents with    Back Pain     Back pain for about a week, across lower back sometimes radiating to front       History of Present Illness  Patient presents clinic earlier than previously scheduled follow-up complaint of pain.  Of note, since her last visit she has followed up several times with Colorectal surgery regarding diverticulitis which led to colonic perforation with development of pelvic abscess and pelvic cellulitis.  He had to have a portion of her large intestines removed with colostomy placed and at this time has a follow-up appointment in 6 weeks for a surgery and possible reanastomosis.  She also complains of hair thinning in the past week and bruising on her hand easily in the past year but her main complaint today is thoracic back pain bilateral not midline without trauma or injury present over the past week.  She denies recent increased activity or potential injury and denies radiating pain bowel or bladder incontinence and is producing normal stool through her colostomy with no blood.     Review of Systems  Review of Systems   Constitutional:  Negative for chills and fever.   HENT:  Negative for congestion and sore throat.    Eyes:  Negative for redness and visual disturbance.   Respiratory:  Negative for cough and shortness of breath.    Cardiovascular:  Negative for chest pain and palpitations.   Gastrointestinal:  Negative for nausea and vomiting.   Genitourinary:  Negative for dysuria and frequency.        Cloudy urine   Musculoskeletal:  Positive for back pain. Negative for arthralgias and myalgias.   Skin:  Negative for pallor and rash.   Neurological:  Negative for dizziness and headaches.   Hematological:  Bruises/bleeds easily.   Psychiatric/Behavioral:  Negative for agitation and dysphoric mood. The patient is nervous/anxious.      Physical Exam  Physical Exam  Constitutional:       Appearance: Normal appearance.   HENT:      Head:  Normocephalic and atraumatic.   Eyes:      General: No scleral icterus.     Conjunctiva/sclera: Conjunctivae normal.   Cardiovascular:      Rate and Rhythm: Normal rate and regular rhythm.   Pulmonary:      Effort: Pulmonary effort is normal.      Breath sounds: Normal breath sounds.   Abdominal:      Comments: Left abdominal wall colostomy in place producing brown stool with no zenon erythema   Musculoskeletal:      Comments: Spinal tenderness or bony crepitus or step-off throughout back and neck.  Significant tenderness with palpable spasms at bilateral paraspinous muscles of thoracic region   Skin:     General: Skin is warm and dry.   Neurological:      General: No focal deficit present.      Mental Status: She is alert and oriented to person, place, and time.   Psychiatric:         Mood and Affect: Mood normal.         Behavior: Behavior normal.       Problem List/Past Medical History  Past Medical History:   Diagnosis Date    Cerebral aneurysm     HTN (hypertension)     Osteoporosis     Vitamin D deficiency        Procedure/Surgical History  Past Surgical History:   Procedure Laterality Date    COLOSTOMY  08/10/2022    ruptured colon      TOTAL HIP ARTHROPLASTY      TUBAL LIGATION      WISDOM TOOTH EXTRACTION         Medications  Current Outpatient Medications on File Prior to Visit   Medication Sig Dispense Refill    clindamycin (CLEOCIN T) 1 % external solution Apply topically.      HYDROcodone-acetaminophen (NORCO)  mg per tablet hydrocodone 10 mg-acetaminophen 325 mg tablet      simvastatin (ZOCOR) 5 MG tablet TAKE 1 TABLET BY MOUTH EVERY DAY AT BEDTIME 90 tablet 3    triamterene-hydrochlorothiazide 37.5-25 mg (DYAZIDE) 37.5-25 mg per capsule triamterene 37.5 mg-hydrochlorothiazide 25 mg capsule      triamterene-hydrochlorothiazide 37.5-25 mg (MAXZIDE-25) 37.5-25 mg per tablet Take by mouth.      [DISCONTINUED] cyanocobalamin 1,000 mcg/mL injection INJECT 1 ML IN THE MUSCLE EVERY 3 WEEKS       No  "current facility-administered medications on file prior to visit.       Allegies  Review of patient's allergies indicates:   Allergen Reactions    Azithromycin      Other reaction(s): wakes up, wakes up "freaking out"    Codeine Itching    Cortisone      Other reaction(s): feels like she isnt breathing        Social History  Social History     Socioeconomic History    Marital status: Single   Tobacco Use    Smoking status: Every Day     Packs/day: 1.00     Types: Cigarettes    Smokeless tobacco: Never   Substance and Sexual Activity    Alcohol use: Never    Drug use: Never    Sexual activity: Yes       Family History  History reviewed. No pertinent family history.      Immunizations  Immunization History   Administered Date(s) Administered    COVID-19, MRNA, LN-S, PF (MODERNA FULL 0.5 ML DOSE) 02/10/2021, 03/12/2021    Influenza - Quadrivalent - PF *Preferred* (6 months and older) 10/28/2016, 11/21/2017    Influenza - Trivalent - PF (ADULT) 09/19/2014, 08/26/2018, 08/28/2018, 10/01/2019, 10/03/2020    Pneumococcal Conjugate - 13 Valent 01/18/2021    Pneumococcal Polysaccharide - 23 Valent 07/15/2021       Health Maintenance  Health Maintenance   Topic Date Due    TETANUS VACCINE  Never done    DEXA Scan  04/05/2022    Mammogram  10/05/2023    Lipid Panel  07/11/2027    Hepatitis C Screening  Completed        Assessment / Plan  Problem List Items Addressed This Visit          GI    Colostomy status    Overview     Plan for surgery with possible reanastomosis 2/13/23.             Orthopedic    Spasm of thoracic back muscle    Overview     Stretching exercising with warm and cool compresses and attempt tizanidine.           RTC 2 months    Nathan Ferrara     " Yes

## 2023-01-23 ENCOUNTER — NON-APPOINTMENT (OUTPATIENT)
Age: 63
End: 2023-01-23

## 2023-02-01 ENCOUNTER — NON-APPOINTMENT (OUTPATIENT)
Age: 63
End: 2023-02-01

## 2023-03-05 ENCOUNTER — RX RENEWAL (OUTPATIENT)
Age: 63
End: 2023-03-05

## 2023-03-23 ENCOUNTER — APPOINTMENT (OUTPATIENT)
Dept: GASTROENTEROLOGY | Facility: CLINIC | Age: 63
End: 2023-03-23
Payer: COMMERCIAL

## 2023-03-23 VITALS
TEMPERATURE: 97.8 F | SYSTOLIC BLOOD PRESSURE: 110 MMHG | DIASTOLIC BLOOD PRESSURE: 80 MMHG | OXYGEN SATURATION: 98 % | HEART RATE: 81 BPM | BODY MASS INDEX: 36.6 KG/M2 | HEIGHT: 67 IN | WEIGHT: 233.2 LBS

## 2023-03-23 DIAGNOSIS — Z83.511 FAMILY HISTORY OF GLAUCOMA: ICD-10-CM

## 2023-03-23 DIAGNOSIS — Z78.9 OTHER SPECIFIED HEALTH STATUS: ICD-10-CM

## 2023-03-23 DIAGNOSIS — K52.9 NONINFECTIVE GASTROENTERITIS AND COLITIS, UNSPECIFIED: ICD-10-CM

## 2023-03-23 PROCEDURE — 99214 OFFICE O/P EST MOD 30 MIN: CPT

## 2023-03-23 RX ORDER — PREDNISONE 10 MG/1
10 TABLET ORAL
Refills: 0 | Status: ACTIVE | COMMUNITY

## 2023-03-23 RX ORDER — BUDESONIDE 3 MG/1
3 CAPSULE, COATED PELLETS ORAL DAILY
Qty: 270 | Refills: 1 | Status: DISCONTINUED | COMMUNITY
Start: 2021-05-10 | End: 2023-03-23

## 2023-03-23 NOTE — ASSESSMENT
[FreeTextEntry1] : The patient is a 62-year-old female with a history of ulcerative colitis.  Patient acute flare seems to be improving on her current regimen of mesalamine as well as Entyvio.  We will continue to taper the prednisone as tolerated.  The patient will get back to me with any acute changes in her status.  The patient states that she was told that she had celiac disease.  I review of her testing done in [place] does not corroborate this diagnosis.  For completeness sake I sent the patient for genetic testing for celiac disease to determine definitively whether she does have the disease.

## 2023-03-23 NOTE — HISTORY OF PRESENT ILLNESS
[de-identified] : Patient had an upper endoscopy which revealed mild gastritis. [FreeTextEntry1] : The patient had a colonoscopy done by another physician this year and was found to have mild pancolitis.

## 2023-03-23 NOTE — REVIEW OF SYSTEMS
[Fever] : no fever [Chills] : no chills [Feeling Poorly] : not feeling poorly [Feeling Tired] : not feeling tired [Abdominal Pain] : no abdominal pain [Diarrhea] : diarrhea [Heartburn] : heartburn [Melena (black stool)] : no melena [Negative] : Respiratory

## 2023-04-17 ENCOUNTER — RX RENEWAL (OUTPATIENT)
Age: 63
End: 2023-04-17

## 2023-04-17 RX ORDER — PREDNISONE 5 MG/1
5 TABLET ORAL DAILY
Qty: 60 | Refills: 1 | Status: ACTIVE | COMMUNITY
Start: 2022-11-22 | End: 1900-01-01

## 2023-07-18 ENCOUNTER — NON-APPOINTMENT (OUTPATIENT)
Age: 63
End: 2023-07-18

## 2023-08-31 ENCOUNTER — APPOINTMENT (OUTPATIENT)
Dept: GASTROENTEROLOGY | Facility: CLINIC | Age: 63
End: 2023-08-31
Payer: COMMERCIAL

## 2023-08-31 VITALS
DIASTOLIC BLOOD PRESSURE: 80 MMHG | SYSTOLIC BLOOD PRESSURE: 130 MMHG | OXYGEN SATURATION: 96 % | HEIGHT: 67 IN | BODY MASS INDEX: 35.94 KG/M2 | TEMPERATURE: 97.8 F | HEART RATE: 83 BPM | WEIGHT: 229 LBS

## 2023-08-31 DIAGNOSIS — R10.13 EPIGASTRIC PAIN: ICD-10-CM

## 2023-08-31 DIAGNOSIS — F41.9 ANXIETY DISORDER, UNSPECIFIED: ICD-10-CM

## 2023-08-31 DIAGNOSIS — Z87.898 PERSONAL HISTORY OF OTHER SPECIFIED CONDITIONS: ICD-10-CM

## 2023-08-31 DIAGNOSIS — Z83.3 FAMILY HISTORY OF DIABETES MELLITUS: ICD-10-CM

## 2023-08-31 DIAGNOSIS — Z87.891 PERSONAL HISTORY OF NICOTINE DEPENDENCE: ICD-10-CM

## 2023-08-31 DIAGNOSIS — K51.00 ULCERATIVE (CHRONIC) PANCOLITIS W/OUT COMPLICATIONS: ICD-10-CM

## 2023-08-31 DIAGNOSIS — Z82.49 FAMILY HISTORY OF ISCHEMIC HEART DISEASE AND OTHER DISEASES OF THE CIRCULATORY SYSTEM: ICD-10-CM

## 2023-08-31 DIAGNOSIS — K21.9 GASTRO-ESOPHAGEAL REFLUX DISEASE W/OUT ESOPHAGITIS: ICD-10-CM

## 2023-08-31 DIAGNOSIS — E03.9 HYPOTHYROIDISM, UNSPECIFIED: ICD-10-CM

## 2023-08-31 DIAGNOSIS — M17.11 UNILATERAL PRIMARY OSTEOARTHRITIS, RIGHT KNEE: ICD-10-CM

## 2023-08-31 DIAGNOSIS — Z80.9 FAMILY HISTORY OF MALIGNANT NEOPLASM, UNSPECIFIED: ICD-10-CM

## 2023-08-31 DIAGNOSIS — J45.909 UNSPECIFIED ASTHMA, UNCOMPLICATED: ICD-10-CM

## 2023-08-31 PROCEDURE — 99213 OFFICE O/P EST LOW 20 MIN: CPT

## 2023-08-31 NOTE — ASSESSMENT
[FreeTextEntry1] : The patient is a 62-year-old female with a history of ulcerative colitis.  Although she is improved with Entyvio she still has intermittent episodes of abdominal pain with diarrhea which is unpredictable.  It is difficult for the patient to perform her daily chores and seek employment.  She will continue receiving her Entyvio infusions and attempt to decrease her prednisone.  If the abdominal pain persists or recurs, she will need a CAT scan of the abdomen and pelvis.  Ceci will get back to me with a progress report.

## 2023-08-31 NOTE — REVIEW OF SYSTEMS
[Fever] : no fever [Chills] : no chills [Feeling Tired] : not feeling tired [Recent Weight Loss (___ Lbs)] : no recent weight loss [Chest Pain] : no chest pain [Palpitations] : no palpitations [SOB on Exertion] : no shortness of breath during exertion [Abdominal Pain] : abdominal pain [Constipation] : no constipation [Diarrhea] : diarrhea [Heartburn] : no heartburn [Melena (black stool)] : no melena [Bloating (gassiness)] : no bloating

## 2023-08-31 NOTE — HISTORY OF PRESENT ILLNESS
[FreeTextEntry1] : The patient had a colonoscopy done in [place] by another physician and was found to have colitis.

## 2023-08-31 NOTE — PHYSICAL EXAM
[Alert] : alert [No Acute Distress] : no acute distress [No Respiratory Distress] : no respiratory distress [Auscultation Breath Sounds / Voice Sounds] : lungs were clear to auscultation bilaterally [Heart Rate And Rhythm] : heart rate was normal and rhythm regular [Murmurs] : no murmurs [Bowel Sounds] : normal bowel sounds [Abdomen Tenderness] : non-tender [Abdomen Soft] : soft [] : no hepatosplenomegaly